# Patient Record
Sex: FEMALE | Race: WHITE | Employment: OTHER | URBAN - METROPOLITAN AREA
[De-identification: names, ages, dates, MRNs, and addresses within clinical notes are randomized per-mention and may not be internally consistent; named-entity substitution may affect disease eponyms.]

---

## 2018-01-03 ENCOUNTER — HOSPITAL ENCOUNTER (OUTPATIENT)
Dept: MAMMOGRAPHY | Age: 78
Discharge: HOME | End: 2018-01-03
Attending: INTERNAL MEDICINE
Payer: MEDICARE

## 2018-01-03 DIAGNOSIS — Z12.31 VISIT FOR SCREENING MAMMOGRAM: ICD-10-CM

## 2018-01-03 PROCEDURE — 77067 SCR MAMMO BI INCL CAD: CPT

## 2018-01-12 ENCOUNTER — HOSPITAL ENCOUNTER (OUTPATIENT)
Dept: GENERAL RADIOLOGY | Age: 78
Discharge: HOME | End: 2018-01-12
Attending: INTERNAL MEDICINE
Payer: MEDICARE

## 2018-01-12 DIAGNOSIS — M79.671 FOOT PAIN, RIGHT: ICD-10-CM

## 2018-01-12 PROCEDURE — 73620 X-RAY EXAM OF FOOT: CPT

## 2018-04-26 ENCOUNTER — HOSPITAL ENCOUNTER (OUTPATIENT)
Dept: MAMMOGRAPHY | Age: 78
Discharge: HOME | End: 2018-04-26
Attending: INTERNAL MEDICINE
Payer: MEDICARE

## 2018-04-26 DIAGNOSIS — Z78.0 POSTMENOPAUSAL: ICD-10-CM

## 2018-04-26 PROCEDURE — 77080 DXA BONE DENSITY AXIAL: CPT

## 2018-05-04 NOTE — PROGRESS NOTES
pls call- bone density shows osteoporosis- but only in wrist so no roller skating for you! .  Hip and spine show osteopenia, so continue to exercise and take vit. d as well as getting adequate calcium from diet.

## 2018-09-25 ENCOUNTER — APPOINTMENT (OUTPATIENT)
Dept: GENERAL RADIOLOGY | Age: 78
End: 2018-09-25
Attending: EMERGENCY MEDICINE
Payer: MEDICARE

## 2018-09-25 ENCOUNTER — HOSPITAL ENCOUNTER (EMERGENCY)
Age: 78
Discharge: HOME | End: 2018-09-25
Attending: EMERGENCY MEDICINE
Payer: MEDICARE

## 2018-09-25 VITALS
WEIGHT: 185 LBS | OXYGEN SATURATION: 95 % | HEART RATE: 64 BPM | BODY MASS INDEX: 31.58 KG/M2 | RESPIRATION RATE: 23 BRPM | TEMPERATURE: 98 F | DIASTOLIC BLOOD PRESSURE: 69 MMHG | HEIGHT: 64 IN | SYSTOLIC BLOOD PRESSURE: 173 MMHG

## 2018-09-25 DIAGNOSIS — R05.9 COUGH: Primary | ICD-10-CM

## 2018-09-25 PROCEDURE — 99284 EMERGENCY DEPT VISIT MOD MDM: CPT

## 2018-09-25 PROCEDURE — 93005 ELECTROCARDIOGRAM TRACING: CPT

## 2018-09-25 PROCEDURE — 71046 X-RAY EXAM CHEST 2 VIEWS: CPT

## 2018-09-25 NOTE — ED TRIAGE NOTES
Pt arrives with persistent cough x several weeks despite mucinex and 2 antibiotic prescription. Denies fever. Denies SOB. O2 sats 97%.

## 2018-09-25 NOTE — ED PROVIDER NOTES
HPI Comments: 77 y.o. female with past medical history significant for TIA, glaucoma, major depression, osteopenia, GERD, colon polyps, asthma, hyperlipidemia, mitral valve prolapse, and macular edema who presents from PCP office for Chest X-Ray. Patient states that she has been coughing for the past month. She notes that her symptoms have progressively improved since taking antibiotics and Mucinex. Patient notes that she visited her PCP, Dr. Grace, today for evaluation of her cough and discussion of medications as she is now prescribed antibiotics from her podiatrist for an inflamed heel. Patient was advised by her PCP to visit [facility] for a chest x-ray as an outpatient but was sent to the ED because she arrived after the facility had closed. Of note, patient claims she has had a h/o bronchitis and \"yellow phlegm\" that typically resolves more quickly than her current symptoms. Patient adds that tests 10 years ago indicated that \"she does not have asthma. \"  She mentions that she has recently enrolled in [facility] [employer] with her . Patient denies fever, chest pain, SOB, nausea, vomiting, abdominal pain, and chills. There are no other acute medical concerns at this time. Social hx: former tobacco use; frequent alcohol use; unknown drug use PCP: Freeman Up MD 
 
Note written by Squire Severance, as dictated by Kenyon Enriquez MD 6:34 PM 
 
 
 
 
The history is provided by the patient. No  was used. Past Medical History:  
Diagnosis Date  Asthma  Colon polyps   
 tubular adenoma 2017  Colon polyps  Fracture 2017 Right humerus (pt fell)  GERD (gastroesophageal reflux disease)  Glaucoma  Humeral fracture 2017  Hyperlipidemia  Macular edema  Major depression in partial remission ([facility] [address]  Mitral valve prolapse  Osteopenia  TIA (transient ischemic attack) Past Surgical History: Procedure Laterality Date  HX BREAST BIOPSY Left   
 benign surgical bx done yrs ago  HX CATARACT REMOVAL    
 HX  SECTION    
 HX CHOLECYSTECTOMY  HX COLONOSCOPY    
 ,   HX TONSILLECTOMY  HX WISDOM TEETH EXTRACTION Family History:  
Problem Relation Age of Onset  Hypertension Mother 80  Coronary Artery Disease Mother  Alcohol abuse Father  Stroke Father  Arrhythmia Brother   
   a fib  Hypertension Brother  Cancer Maternal Grandmother Social History Social History  Marital status:  Spouse name: N/A  
 Number of children: N/A  
 Years of education: N/A Occupational History  retired  Social History Main Topics  Smoking status: Former Smoker Packs/day: 0.25 Years: 10.00 Types: Cigarettes  Smokeless tobacco: Never Used  Alcohol use 4.2 oz/week  
  7 Glasses of wine per week  Drug use: Yes Special: Prescription, OTC  Sexual activity: Not on file Other Topics Concern  Not on file Social History Narrative ALLERGIES: Review of patient's allergies indicates no known allergies. Review of Systems Constitutional: Negative for activity change, chills and fever. HENT: Negative for nosebleeds, sore throat, trouble swallowing and voice change. Eyes: Negative for visual disturbance. Respiratory: Positive for cough. Negative for shortness of breath. Cardiovascular: Negative for chest pain and palpitations. Gastrointestinal: Negative for abdominal pain, constipation, diarrhea and nausea. Genitourinary: Negative for difficulty urinating, dysuria, hematuria and urgency. Musculoskeletal: Negative for back pain, neck pain and neck stiffness. Skin: Negative for color change. Allergic/Immunologic: Negative for immunocompromised state.   
Neurological: Negative for dizziness, seizures, syncope, weakness, light-headedness, numbness and headaches. Psychiatric/Behavioral: Negative for behavioral problems, confusion, hallucinations, self-injury and suicidal ideas. All other systems reviewed and are negative. Vitals:  
 09/25/18 1718 09/25/18 1835 BP: 141/77 169/69 Pulse: 79 (!) 117 Resp: 18 14 Temp: 97.7 °F (36.5 °C) 97.7 °F (36.5 °C) SpO2: 97% 97% Weight: 83.9 kg (185 lb) Height: 5' 4\" (1.626 m) Physical Exam  
Constitutional: She is oriented to person, place, and time. She appears well-developed and well-nourished. No distress. Well-appearing elderly female; HENT:  
Head: Normocephalic and atraumatic. Eyes: Pupils are equal, round, and reactive to light. Neck: Normal range of motion. Neck supple. Cardiovascular: Normal rate, regular rhythm and normal heart sounds. Exam reveals no gallop and no friction rub. No murmur heard. Afebrile without tachycardia or hypotension Pulmonary/Chest: Effort normal and breath sounds normal. No respiratory distress. She has no wheezes. She has no rales. Clear breath sounds; no crackles, rales, wheezes Abdominal: Soft. Bowel sounds are normal. She exhibits no distension. There is no tenderness. There is no rebound and no guarding. Musculoskeletal: Normal range of motion. She exhibits no edema (no pedal edema). Neurological: She is alert and oriented to person, place, and time. Skin: Skin is warm. No rash noted. She is not diaphoretic. Psychiatric: She has a normal mood and affect. Her behavior is normal. Judgment and thought content normal.  
Nursing note and vitals reviewed. Note written by Rodolfo Lantigua, as dictated by Selena Joe MD 6:34 PM  
 
 
[facility] 
 
 
ED Course This is a 71-year-old female with past medical history, review of systems, physical exam as above, presenting from her primary care physician's office for x-ray, following several weeks of improving cough.  The patient denies chest pain, dyspnea, recent travel, fevers, chills, nausea or vomiting. She states she is on her second course of antibiotics, using decongestants and feels the symptoms are improving, however her primary care physician water her to receive a chest x-ray. Physical exam is remarkable for well-appearing elderly female in no acute distress, noted afebrile without tachycardia or hypotension. She has clear breath sounds throughout without crackles, rales or wheezes, no pedal edema. The patient has a history of asthma is well controlled. Given benign vital signs and physical exam, and proceed with chest x-ray, and make a disposition based the patient's diagnostics and response to therapy. Procedures 8:22 PM 
Patient with unremarkable CXR, EKG, remains largely asymptomatic. Will DC home with PCP follow up and return precautions.

## 2018-09-26 NOTE — DISCHARGE INSTRUCTIONS
Cough: Care Instructions  Your Care Instructions    A cough is your body's response to something that bothers your throat or airways. Many things can cause a cough. You might cough because of a cold or the flu, bronchitis, or asthma. Smoking, postnasal drip, allergies, and stomach acid that backs up into your throat also can cause coughs. A cough is a symptom, not a disease. Most coughs stop when the cause, such as a cold, goes away. You can take a few steps at home to cough less and feel better. Follow-up care is a key part of your treatment and safety. Be sure to make and go to all appointments, and call your doctor if you are having problems. It's also a good idea to know your test results and keep a list of the medicines you take. How can you care for yourself at home? · Drink lots of water and other fluids. This helps thin the mucus and soothes a dry or sore throat. Honey or lemon juice in hot water or tea may ease a dry cough. · Take cough medicine as directed by your doctor. · Prop up your head on pillows to help you breathe and ease a dry cough. · Try cough drops to soothe a dry or sore throat. Cough drops don't stop a cough. Medicine-flavored cough drops are no better than candy-flavored drops or hard candy. · Do not smoke. Avoid secondhand smoke. If you need help quitting, talk to your doctor about stop-smoking programs and medicines. These can increase your chances of quitting for good. When should you call for help? Call 911 anytime you think you may need emergency care.  For example, call if:    · You have severe trouble breathing.    Call your doctor now or seek immediate medical care if:    · You cough up blood.     · You have new or worse trouble breathing.     · You have a new or higher fever.     · You have a new rash.    Watch closely for changes in your health, and be sure to contact your doctor if:    · You cough more deeply or more often, especially if you notice more mucus or a change in the color of your mucus.     · You have new symptoms, such as a sore throat, an earache, or sinus pain.     · You do not get better as expected. Where can you learn more? Go to http://robin-matty.info/. Enter D279 in the search box to learn more about \"Cough: Care Instructions. \"  Current as of: December 6, 2017  Content Version: 11.7  © 1344-6561 [employer]. Care instructions adapted under license by [employer] (which disclaims liability or warranty for this information). If you have questions about a medical condition or this instruction, always ask your healthcare professional. [address] any warranty or liability for your use of this information.

## 2018-09-27 LAB
ATRIAL RATE: 74 BPM
CALCULATED P AXIS, ECG09: 60 DEGREES
CALCULATED R AXIS, ECG10: -6 DEGREES
CALCULATED T AXIS, ECG11: 6 DEGREES
DIAGNOSIS, 93000: NORMAL
P-R INTERVAL, ECG05: 174 MS
Q-T INTERVAL, ECG07: 408 MS
QRS DURATION, ECG06: 78 MS
QTC CALCULATION (BEZET), ECG08: 452 MS
VENTRICULAR RATE, ECG03: 74 BPM

## 2019-01-04 ENCOUNTER — HOSPITAL ENCOUNTER (OUTPATIENT)
Dept: MAMMOGRAPHY | Age: 79
Discharge: HOME | End: 2019-01-04
Attending: INTERNAL MEDICINE
Payer: MEDICARE

## 2019-01-04 DIAGNOSIS — Z12.31 VISIT FOR SCREENING MAMMOGRAM: ICD-10-CM

## 2019-01-04 PROCEDURE — 77063 BREAST TOMOSYNTHESIS BI: CPT

## 2019-02-04 ENCOUNTER — HOSPITAL ENCOUNTER (OUTPATIENT)
Dept: GENERAL RADIOLOGY | Age: 79
Discharge: HOME | End: 2019-02-04
Attending: INTERNAL MEDICINE
Payer: MEDICARE

## 2019-02-04 DIAGNOSIS — M25.532 LEFT WRIST PAIN: ICD-10-CM

## 2019-02-04 PROCEDURE — 73100 X-RAY EXAM OF WRIST: CPT

## 2020-01-13 ENCOUNTER — ANESTHESIA EVENT (OUTPATIENT)
Dept: ENDOSCOPY | Age: 80
End: 2020-01-13
Payer: MEDICARE

## 2020-01-13 ENCOUNTER — ANESTHESIA (OUTPATIENT)
Dept: ENDOSCOPY | Age: 80
End: 2020-01-13
Payer: MEDICARE

## 2020-01-13 ENCOUNTER — HOSPITAL ENCOUNTER (OUTPATIENT)
Age: 80
Setting detail: OUTPATIENT SURGERY
Discharge: HOME | End: 2020-01-13
Attending: INTERNAL MEDICINE | Admitting: INTERNAL MEDICINE
Payer: MEDICARE

## 2020-01-13 VITALS
OXYGEN SATURATION: 97 % | HEIGHT: 65 IN | DIASTOLIC BLOOD PRESSURE: 66 MMHG | RESPIRATION RATE: 19 BRPM | TEMPERATURE: 98 F | HEART RATE: 64 BPM | SYSTOLIC BLOOD PRESSURE: 135 MMHG | BODY MASS INDEX: 30.99 KG/M2 | WEIGHT: 186 LBS

## 2020-01-13 PROCEDURE — 77030009426 HC FCPS BIOP ENDOSC BSC -B: Performed by: INTERNAL MEDICINE

## 2020-01-13 PROCEDURE — 74011250637 HC RX REV CODE- 250/637: Performed by: INTERNAL MEDICINE

## 2020-01-13 PROCEDURE — 76060000032 HC ANESTHESIA 0.5 TO 1 HR: Performed by: INTERNAL MEDICINE

## 2020-01-13 PROCEDURE — 88305 TISSUE EXAM BY PATHOLOGIST: CPT

## 2020-01-13 PROCEDURE — 74011250636 HC RX REV CODE- 250/636: Performed by: NURSE ANESTHETIST, CERTIFIED REGISTERED

## 2020-01-13 PROCEDURE — 74011000250 HC RX REV CODE- 250: Performed by: NURSE ANESTHETIST, CERTIFIED REGISTERED

## 2020-01-13 PROCEDURE — 76040000007: Performed by: INTERNAL MEDICINE

## 2020-01-13 RX ORDER — MIDAZOLAM HYDROCHLORIDE 1 MG/ML
.25-5 INJECTION, SOLUTION INTRAMUSCULAR; INTRAVENOUS
Status: DISCONTINUED | OUTPATIENT
Start: 2020-01-13 | End: 2020-01-13 | Stop reason: HOSPADM

## 2020-01-13 RX ORDER — SODIUM CHLORIDE 9 MG/ML
50 INJECTION, SOLUTION INTRAVENOUS CONTINUOUS
Status: DISCONTINUED | OUTPATIENT
Start: 2020-01-13 | End: 2020-01-13 | Stop reason: HOSPADM

## 2020-01-13 RX ORDER — SODIUM CHLORIDE 9 MG/ML
INJECTION, SOLUTION INTRAVENOUS
Status: DISCONTINUED | OUTPATIENT
Start: 2020-01-13 | End: 2020-01-13 | Stop reason: HOSPADM

## 2020-01-13 RX ORDER — SODIUM CHLORIDE 0.9 % (FLUSH) 0.9 %
5-40 SYRINGE (ML) INJECTION EVERY 8 HOURS
Status: DISCONTINUED | OUTPATIENT
Start: 2020-01-13 | End: 2020-01-13 | Stop reason: HOSPADM

## 2020-01-13 RX ORDER — FENTANYL CITRATE 50 UG/ML
25-200 INJECTION, SOLUTION INTRAMUSCULAR; INTRAVENOUS
Status: DISCONTINUED | OUTPATIENT
Start: 2020-01-13 | End: 2020-01-13 | Stop reason: HOSPADM

## 2020-01-13 RX ORDER — ATROPINE SULFATE 0.1 MG/ML
0.5 INJECTION INTRAVENOUS
Status: DISCONTINUED | OUTPATIENT
Start: 2020-01-13 | End: 2020-01-13 | Stop reason: HOSPADM

## 2020-01-13 RX ORDER — FLUMAZENIL 0.1 MG/ML
0.2 INJECTION INTRAVENOUS
Status: DISCONTINUED | OUTPATIENT
Start: 2020-01-13 | End: 2020-01-13 | Stop reason: HOSPADM

## 2020-01-13 RX ORDER — EPINEPHRINE 0.1 MG/ML
1 INJECTION INTRACARDIAC; INTRAVENOUS
Status: DISCONTINUED | OUTPATIENT
Start: 2020-01-13 | End: 2020-01-13 | Stop reason: HOSPADM

## 2020-01-13 RX ORDER — LIDOCAINE HYDROCHLORIDE 20 MG/ML
INJECTION, SOLUTION INFILTRATION; PERINEURAL AS NEEDED
Status: DISCONTINUED | OUTPATIENT
Start: 2020-01-13 | End: 2020-01-13 | Stop reason: HOSPADM

## 2020-01-13 RX ORDER — PROPOFOL 10 MG/ML
INJECTION, EMULSION INTRAVENOUS AS NEEDED
Status: DISCONTINUED | OUTPATIENT
Start: 2020-01-13 | End: 2020-01-13 | Stop reason: HOSPADM

## 2020-01-13 RX ORDER — SODIUM CHLORIDE 0.9 % (FLUSH) 0.9 %
5-40 SYRINGE (ML) INJECTION AS NEEDED
Status: DISCONTINUED | OUTPATIENT
Start: 2020-01-13 | End: 2020-01-13 | Stop reason: HOSPADM

## 2020-01-13 RX ORDER — NALOXONE HYDROCHLORIDE 0.4 MG/ML
0.4 INJECTION, SOLUTION INTRAMUSCULAR; INTRAVENOUS; SUBCUTANEOUS
Status: DISCONTINUED | OUTPATIENT
Start: 2020-01-13 | End: 2020-01-13 | Stop reason: HOSPADM

## 2020-01-13 RX ORDER — DEXTROMETHORPHAN/PSEUDOEPHED 2.5-7.5/.8
1.2 DROPS ORAL
Status: DISCONTINUED | OUTPATIENT
Start: 2020-01-13 | End: 2020-01-13 | Stop reason: HOSPADM

## 2020-01-13 RX ADMIN — PROPOFOL 20 MG: 10 INJECTION, EMULSION INTRAVENOUS at 11:49

## 2020-01-13 RX ADMIN — PROPOFOL 20 MG: 10 INJECTION, EMULSION INTRAVENOUS at 12:00

## 2020-01-13 RX ADMIN — PROPOFOL 20 MG: 10 INJECTION, EMULSION INTRAVENOUS at 11:56

## 2020-01-13 RX ADMIN — PROPOFOL 20 MG: 10 INJECTION, EMULSION INTRAVENOUS at 11:41

## 2020-01-13 RX ADMIN — PROPOFOL 20 MG: 10 INJECTION, EMULSION INTRAVENOUS at 11:45

## 2020-01-13 RX ADMIN — PROPOFOL 20 MG: 10 INJECTION, EMULSION INTRAVENOUS at 11:48

## 2020-01-13 RX ADMIN — LIDOCAINE HYDROCHLORIDE 80 MG: 20 INJECTION, SOLUTION INFILTRATION; PERINEURAL at 11:39

## 2020-01-13 RX ADMIN — PROPOFOL 20 MG: 10 INJECTION, EMULSION INTRAVENOUS at 11:47

## 2020-01-13 RX ADMIN — PROPOFOL 20 MG: 10 INJECTION, EMULSION INTRAVENOUS at 11:58

## 2020-01-13 RX ADMIN — PROPOFOL 20 MG: 10 INJECTION, EMULSION INTRAVENOUS at 11:54

## 2020-01-13 RX ADMIN — PROPOFOL 20 MG: 10 INJECTION, EMULSION INTRAVENOUS at 11:52

## 2020-01-13 RX ADMIN — PROPOFOL 20 MG: 10 INJECTION, EMULSION INTRAVENOUS at 11:44

## 2020-01-13 RX ADMIN — PROPOFOL 50 MG: 10 INJECTION, EMULSION INTRAVENOUS at 11:39

## 2020-01-13 RX ADMIN — PROPOFOL 50 MG: 10 INJECTION, EMULSION INTRAVENOUS at 11:40

## 2020-01-13 RX ADMIN — PROPOFOL 20 MG: 10 INJECTION, EMULSION INTRAVENOUS at 11:51

## 2020-01-13 RX ADMIN — PROPOFOL 20 MG: 10 INJECTION, EMULSION INTRAVENOUS at 11:43

## 2020-01-13 RX ADMIN — SODIUM CHLORIDE: 900 INJECTION, SOLUTION INTRAVENOUS at 11:31

## 2020-01-13 RX ADMIN — PROPOFOL 20 MG: 10 INJECTION, EMULSION INTRAVENOUS at 11:46

## 2020-01-13 RX ADMIN — PROPOFOL 20 MG: 10 INJECTION, EMULSION INTRAVENOUS at 11:53

## 2020-01-13 RX ADMIN — PROPOFOL 20 MG: 10 INJECTION, EMULSION INTRAVENOUS at 11:59

## 2020-01-13 RX ADMIN — PROPOFOL 20 MG: 10 INJECTION, EMULSION INTRAVENOUS at 11:42

## 2020-01-13 NOTE — ANESTHESIA PREPROCEDURE EVALUATION
Anesthetic History   No history of anesthetic complications            Review of Systems / Medical History  Patient summary reviewed, nursing notes reviewed and pertinent labs reviewed    Pulmonary            Asthma        Neuro/Psych         TIA and psychiatric history     Cardiovascular  Within defined limits                Exercise tolerance: >4 METS     GI/Hepatic/Renal     GERD           Endo/Other  Within defined limits           Other Findings              Physical Exam    Airway  Mallampati: II  TM Distance: 4 - 6 cm  Neck ROM: normal range of motion   Mouth opening: Normal     Cardiovascular    Rhythm: regular  Rate: normal         Dental  No notable dental hx       Pulmonary  Breath sounds clear to auscultation               Abdominal  Abdominal exam normal       Other Findings            Anesthetic Plan    ASA: 2  Anesthesia type: MAC          Induction: Intravenous  Anesthetic plan and risks discussed with: Patient

## 2020-01-13 NOTE — H&P
pain, fever, weight loss, pain with defecation, cramping, bloating, anxiety, diabetes mellitus, constipation, heat intolerance, joint pains, nausea, nocturnal bowel movements, past history of abdominal surgery, recent travel to tropics, similar illness in other people eating the same meal, skin lesions, start of a new medication, tenesmus, upper respiratory infection symptoms, vomiting, weakness, fecal urgency, alcohol abuse, recent antibiotic use, history of lactose intolerance, history of irritable bowel syndrome, history of Crohn's, history of ulcerative colitis, excessive alcohol intake, history of thyroid disease, cold intolerance, family history of colon cancer, family history of IBD, family history of celiac sprue, similar illness in other people eating the same food, HIV infection, chronic diarrhea, arslan-colored stools, excessive alcohol use, excessive caffeine intake, incontinence of stool, steatorrhea, sense of incomplete evacuation (>25 percent of time), pain relieved with defecation, change in bowel habits, recent travel, history of radiation for prostate cancer, similar illness in other people or other symptoms. Note for \"Diarrhea\": she has h/o colonic polyps in 2017 done in the [facility] and was recommended follow up colonoscopy 1 year after that for low grade dysplasia seen on cecal polyp. she c/o last month of diarrhea for few days, resolved, no current GI problems      Problem List/Past Medical Aniket Aleman; 2019 2:04 PM)  Transient ischemic attack      Past Surgical History Aniket Aleman; 2019 2:04 PM)  Cholecystectomy     Delivery   x2    Allergies Aniket Aleman; 2019 2:04 PM)  No Known Drug Allergies  [10/28/2019]:  No Known Allergies  [10/28/2019]:    Medication History (Conrad Aleman; 2019 2:04 PM)  Citalopram Hydrobromide  (20MG Tablet, 1 Oral daily) Active. Omeprazole  (20MG Capsule DR, 1 Oral daily) Active.   Baby Aspirin  (81MG Tablet Chewable, 1 Oral daily) Active. Vitamin D (Cholecalciferol)  (1000UNIT Capsule, 2 Oral daily) Active. Alphagan  (0.2% Solution, Ophthalmic bid) Active. Latanoprost  (0.005% Emulsion, Ophthalmic daily) Active. Medications Reconciled     Family History Marii Sparks; 11/1/2019 2:05 PM)  None  [11/01/2019]:    Social History Marii Sparks; 11/1/2019 2:04 PM)  Marital status   . Employment status   Retired. Alcohol Use   Moderate alcohol use. Tobacco Use   Never smoker. Blood Transfusion   unsure    Diagnostic Studies History Marii Sparks; 11/1/2019 2:04 PM)  Colonoscopy   >6yrs    Health Maintenance History Marii Sparks; 11/1/2019 2:04 PM)  Flu Vaccine  [3162]:        Review of Systems (Jesus Sparks; 11/1/2019 2:04 PM)  General Not Present- Chronic Fatigue, Poor Appetite, Weight Gain and Weight Loss. Skin Not Present- Itching, Rash and Skin Color Changes. HEENT Not Present- Hearing Loss and Vertigo. Respiratory Not Present- Difficulty Breathing and TB exposure. Cardiovascular Not Present- Chest Pain, Use of Antibiotics before Dental Procedures and Use of Blood Thinners. Gastrointestinal Present- See HPI. Musculoskeletal Not Present- Arthritis, Hip Replacement Surgery and Knee Replacement Surgery. Neurological Not Present- Weakness. Psychiatric Not Present- Depression. Endocrine Not Present- Diabetes and Thyroid Problems. Hematology Not Present- Anemia. Vitals Marii Sparks; 11/1/2019 2:10 PM)  11/1/2019 2:04 PM  Weight: 188.6 lb   Height: 65 in   Body Surface Area: 1.93 m²   Body Mass Index: 31.38 kg/m²    Pulse: 80 (Regular)    Resp.: 20 (Unlabored)     BP: 112/68 (Sitting, Left Arm, Standard)              Physical Exam Lindalou Goldberg MD; 11/1/2019 4:08 PM)  General  Mental Status - Alert. General Appearance - Cooperative, Pleasant, Not in acute distress. Orientation - Oriented X3.   Build & Nutrition - Well nourished and Well developed. Integumentary  General Characteristics  Overall examination of the patient's skin reveals - no rashes, no bruises and no spider angiomas. Color - normal coloration of skin. Head and Neck  Neck  Global Assessment - full range of motion and supple, no bruit auscultated on the right, no bruit auscultated on the left, non-tender, no lymphadenopathy. Thyroid  Gland Characteristics - normal size and consistency. Eye  Eyeball - Left - No Exophthalmos. Eyeball - Right - No Exophthalmos. Sclera/Conjunctiva - Left - No Jaundice. Sclera/Conjunctiva - Right - No Jaundice. Chest and Lung Exam  Chest and lung exam reveals  - quiet, even and easy respiratory effort with no use of accessory muscles. Auscultation  Breath sounds - Normal. Adventitious sounds - No Adventitious sounds. Cardiovascular  Auscultation  Rhythm - Regular, No Tachycardia, No Bradycardia . Heart Sounds - Normal heart sounds , S1 WNL and S2 WNL, No S3, No Summation Gallop. Murmurs & Other Heart Sounds - Auscultation of the heart reveals - No Murmurs. Abdomen  Palpation/Percussion  Tenderness - Non-Tender. Rebound tenderness - No rebound. Rigidity (guarding) - No Rigidity. Dullness to percussion - No abnormal dullness to percussion. Liver - No hepatosplenomegaly. Abdominal Mass Palpable - No masses. Other Characteristics - No Ascites. Auscultation  Auscultation of the abdomen reveals - Bowel sounds normal, No Abdominal bruits and No Succussion splash. Rectal - Did not examine. Peripheral Vascular  Upper Extremity  Inspection - Left - Normal - No Clubbing, No Cyanosis, No Edema, Pulses Intact. Right - Normal - No Clubbing, No Cyanosis, No Edema, Pulses Intact. Palpation - Edema - Left - No edema. Right - No edema. Lower Extremity  Inspection - Left - Inspection Normal. Right - Inspection Normal. Palpation - Edema - Left - No edema. Right - No edema.     Neurologic  Neurologic evaluation reveals  - Cranial nerves grossly intact, no focal neurologic deficits. Motor  Involuntary Movements - Asterixis - not present. Musculoskeletal  Global Assessment  Gait and Station - normal gait and station. Assessment & Plan Melinda Quiroga MD; 11/1/2019 4:09 PM)  Diarrhea (787.91  R19.7)  Impression: she has h/o colonic polyps in 2017 done in the [facility] and was recommended follow up colonoscopy 1 year after that for low grade dysplasia seen on cecal polyp. she c/o last month of diarrhea for few days, resolved, no current GI problems  Current Plans  Pt Education - How to access health information online: discussed with patient and provided information. History of colon polyps (V12.72  Z86.010)  Impression: given sample of suprep  Current Plans  Discussed the risks and benefits of colonoscopy with the patient. Colonoscopy (51616) (Discussed risks and benefits with the patient to include:; perforation, post polypectomy, or post biopsy bleeding, missed lesions, and sedation reactions.)  Date of Surgery Update:  Michele Lewis was seen and examined. History and physical has been reviewed. The patient has been examined.  There have been no significant clinical changes since the completion of the originally dated History and Physical.    Signed By: Michaela Esteves MD     January 13, 2020 11:34 AM

## 2020-01-13 NOTE — PROCEDURES
[address]  [address] [facility], [address] [place] [address]      Colonoscopy Operative Report    Lindy Hendricks  [id number]  1940      Procedure Type:   Colonoscopy with polypectomy (hot biopsy)     Indications:    Personal history of colon polyps (screening only)   Date of last colonoscopy: 2017, Polyps  Yes    Pre-operative Diagnosis: see indication above    Post-operative Diagnosis:  See findings below    :  Natividad Tan MD    Surgical Assistant: None    Implants:  None    Referring Provider: Carrol Caba MD      Sedation:  MAC anesthesia Propofol      Procedure Details:  After informed consent was obtained with all risks and benefits of procedure explained and preoperative exam completed, the patient was taken to the endoscopy suite and placed in the left lateral decubitus position. Upon sequential sedation as per above, a digital rectal exam was performed demonstrating internal hemorrhoids. The Olympus videocolonoscope  was inserted in the rectum and carefully advanced to the cecum, which was identified by the ileocecal valve and appendiceal orifice. The cecum was identified by the ileocecal valve and appendiceal orifice. The quality of preparation was good. The colonoscope was slowly withdrawn with careful evaluation between folds. Retroflexion in the rectum was completed . Findings:   Rectum: normal  Sigmoid: mild diverticulosis  Descending Colon: normal  Transverse Colon: 9 mm polyp removed by hot biopsy  Ascending Colon: normal  Cecum: 9 mm polyp removed by hot biopsy        Specimen Removed:  as above    Complications: None. EBL:  None. Impression:    see findings    Recommendations: --Await pathology.       Recommendation for next colonscopy in 3 years  High fiber diet    Signed By: Natividad Tan MD     1/13/2020  12:06 PM

## 2020-01-13 NOTE — PERIOP NOTES

## 2020-01-13 NOTE — ANESTHESIA POSTPROCEDURE EVALUATION
Post-Anesthesia Evaluation and Assessment    Patient: Gail Abel MRN: [id number]  SSN: [id number]    YOB: 1940  Age: [de-identified] y.o. Sex: female      I have evaluated the patient and they are stable and ready for discharge from the PACU. Cardiovascular Function/Vital Signs  Visit Vitals  /68   Pulse 76   Temp 36.6 °C (97.9 °F)   Resp (!) 38   Ht 5' 5\" (1.651 m)   Wt 84.4 kg (186 lb)   SpO2 95%   BMI 30.95 kg/m²       Patient is status post MAC anesthesia for Procedure(s):  COLONOSCOPY  ENDOSCOPIC POLYPECTOMY. Nausea/Vomiting: None    Postoperative hydration reviewed and adequate. Pain:  Pain Scale 1: Numeric (0 - 10) (01/13/20 1108)  Pain Intensity 1: 0 (01/13/20 1108)   Managed    Neurological Status: At baseline    Mental Status, Level of Consciousness: Alert and  oriented to person, place, and time    Pulmonary Status:   O2 Device: CO2 nasal cannula (01/13/20 1202)   Adequate oxygenation and airway patent    Complications related to anesthesia: None    Post-anesthesia assessment completed.  No concerns    Signed By: Emili Macias MD     January 13, 2020

## 2020-01-13 NOTE — DISCHARGE INSTRUCTIONS
[address], [address] [place] [address]    COLON DISCHARGE INSTRUCTIONS    Maria Isabel Calix  [id number]  1940    Discomfort:  Redness at IV site- apply warm compress to area; if redness or soreness persist- contact your physician  There may be a slight amount of blood passed from the rectum  Gaseous discomfort- walking, belching will help relieve any discomfort  You may not operate a vehicle for 12 hours  You may not engage in an occupation involving machinery or appliances for rest of today  You may not drink alcoholic beverages for at least 12 hours  Avoid making any critical decisions for at least 24 hour  DIET:  You may resume your regular diet - however -  remember your colon is empty and a heavy meal will produce gas. Avoid these foods:  vegetables, fried / greasy foods, carbonated drinks     ACTIVITY:  You may  resume your normal daily activities it is recommended that you spend the remainder of the day resting -  avoid any strenuous activity. CALL M.D. ANY SIGN OF:   Increasing pain, nausea, vomiting  Abdominal distension (swelling)  New increased bleeding (oral or rectal)  Fever (chills)  Pain in chest area  Bloody discharge from nose or mouth  Shortness of breath      Follow-up Instructions:   Call Dr. Maryjean Boeck for any questions or problems at [phone]   High fiber diet          ENDOSCOPY FINDINGS:   Your colonoscopy showed 2 small polyps removed, and diverticulosis.   Telephone # [phone]      Signed By: Maryjean Boeck, MD     1/13/2020  12:09 PM       DISCHARGE SUMMARY from Nurse    The following personal items collected during your admission are returned to you:   Dental Appliance: Dental Appliances: None  Vision: Visual Aid: None  Hearing Aid:    Jewelry:    Clothing:    Other Valuables:    Valuables sent to safe:      Patient Education   Patient Education        Diverticulosis: Care Instructions  Your Care Instructions  In diverticulosis, pouches called diverticula form in the wall of the large intestine (colon). The pouches do not cause any pain or other symptoms. Most people who have diverticulosis do not know they have it. But the pouches sometimes bleed, and if they become infected, they can cause pain and other symptoms. When this happens, it is called diverticulitis. Diverticula form when pressure pushes the wall of the colon outward at certain weak points. A diet that is too low in fiber can cause diverticula. Follow-up care is a key part of your treatment and safety. Be sure to make and go to all appointments, and call your doctor if you are having problems. It's also a good idea to know your test results and keep a list of the medicines you take. How can you care for yourself at home? · Include fruits, leafy green vegetables, beans, and whole grains in your diet each day. These foods are high in fiber. · Take a fiber supplement, such as Citrucel or Metamucil, every day if needed. Read and follow all instructions on the label. · Drink plenty of fluids, enough so that your urine is light yellow or clear like water. If you have kidney, heart, or liver disease and have to limit fluids, talk with your doctor before you increase the amount of fluids you drink. · Get at least 30 minutes of exercise on most days of the week. Walking is a good choice. You also may want to do other activities, such as running, swimming, cycling, or playing tennis or team sports. · Cut out foods that cause gas, pain, or other symptoms. When should you call for help?   Call your doctor now or seek immediate medical care if:    · You have belly pain.     · You pass maroon or very bloody stools.     · You have a fever.     · You have nausea and vomiting.     · You have unusual changes in your bowel movements or abdominal swelling.     · You have burning pain when you urinate.     · You have abnormal vaginal discharge.     · You have shoulder pain.     · You have cramping pain that does not get better when you have a bowel movement or pass gas.     · You pass gas or stool from your urethra while urinating.    Watch closely for changes in your health, and be sure to contact your doctor if you have any problems. Where can you learn more? Go to http://robin-matty.info/. Enter R281 in the search box to learn more about \"Diverticulosis: Care Instructions. \"  Current as of: November 7, 2018  Content Version: 12.2  © 1440-2799 [employer]. Care instructions adapted under license by [employer] (which disclaims liability or warranty for this information). If you have questions about a medical condition or this instruction, always ask your healthcare professional. [address] any warranty or liability for your use of this information. Colon Polyps: Care Instructions  Your Care Instructions    Colon polyps are growths in the colon or the rectum. The cause of most colon polyps is not known, and most people who get them do not have any problems. But a certain kind can turn into cancer. For this reason, regular testing for colon polyps is important for people as they get older. It is also important for anyone who has an increased risk for colon cancer. Polyps are usually found through routine colon cancer screening tests. Although most colon polyps are not cancerous, they are usually removed and then tested for cancer. Screening for colon cancer saves lives because the cancer can usually be cured if it is caught early. If you have a polyp that is the type that can turn into cancer, you may need more tests to examine your entire colon. The doctor will remove any other polyps that he or she finds, and you will be tested more often. Follow-up care is a key part of your treatment and safety. Be sure to make and go to all appointments, and call your doctor if you are having problems.  It's also a good idea to know your test results and keep a list of the medicines you take. How can you care for yourself at home? Regular exams to look for colon polyps are the best way to prevent polyps from turning into colon cancer. These can include stool tests, sigmoidoscopy, colonoscopy, and CT colonography. Talk with your doctor about a testing schedule that is right for you. To prevent polyps  There is no home treatment that can prevent colon polyps. But these steps may help lower your risk for cancer. · Stay active. Being active can help you get to and stay at a healthy weight. Try to exercise on most days of the week. Walking is a good choice. · Eat well. Choose a variety of vegetables, fruits, legumes (such as peas and beans), fish, poultry, and whole grains. · Do not smoke. If you need help quitting, talk to your doctor about stop-smoking programs and medicines. These can increase your chances of quitting for good. · If you drink alcohol, limit how much you drink. Limit alcohol to 2 drinks a day for men and 1 drink a day for women. When should you call for help? Call your doctor now or seek immediate medical care if:    · You have severe belly pain.     · Your stools are maroon or very bloody.    Watch closely for changes in your health, and be sure to contact your doctor if:    · You have a fever.     · You have nausea or vomiting.     · You have a change in bowel habits (new constipation or diarrhea).     · Your symptoms get worse or are not improving as expected. Where can you learn more? Go to http://robin-matty.info/. Enter 95 [id number] in the search box to learn more about \"Colon Polyps: Care Instructions. \"  Current as of: December 19, 2018  Content Version: 12.2  © 4274-1181 [employer]. Care instructions adapted under license by [employer] (which disclaims liability or warranty for this information).  If you have questions about a medical condition or this instruction, always ask your healthcare professional. [place] [address] any warranty or liability for your use of this information.

## 2020-02-05 ENCOUNTER — HOSPITAL ENCOUNTER (OUTPATIENT)
Dept: MAMMOGRAPHY | Age: 80
Discharge: HOME | End: 2020-02-05
Attending: INTERNAL MEDICINE
Payer: MEDICARE

## 2020-02-05 DIAGNOSIS — Z12.31 VISIT FOR SCREENING MAMMOGRAM: ICD-10-CM

## 2020-02-05 PROCEDURE — 77063 BREAST TOMOSYNTHESIS BI: CPT

## 2020-04-16 ENCOUNTER — HOSPITAL ENCOUNTER (OUTPATIENT)
Dept: CT IMAGING | Age: 80
Discharge: HOME | End: 2020-04-16
Attending: INTERNAL MEDICINE
Payer: MEDICARE

## 2020-04-16 DIAGNOSIS — R47.81 SLURRED SPEECH: ICD-10-CM

## 2020-04-16 PROCEDURE — 70450 CT HEAD/BRAIN W/O DYE: CPT

## 2020-09-14 ENCOUNTER — HOSPITAL ENCOUNTER (OUTPATIENT)
Dept: ULTRASOUND IMAGING | Age: 80
Discharge: HOME | End: 2020-09-14
Attending: INTERNAL MEDICINE
Payer: MEDICARE

## 2020-09-14 DIAGNOSIS — R31.9 HEMATURIA, UNSPECIFIED TYPE: ICD-10-CM

## 2020-09-14 PROCEDURE — 76770 US EXAM ABDO BACK WALL COMP: CPT

## 2021-01-09 LAB
CREATININE, EXTERNAL: 1.01
HBA1C MFR BLD HPLC: 5.8 %
LDL-C, EXTERNAL: 119

## 2021-01-26 ENCOUNTER — OFFICE VISIT (OUTPATIENT)
Dept: CARDIOLOGY CLINIC | Age: 81
End: 2021-01-26
Payer: MEDICARE

## 2021-01-26 VITALS
BODY MASS INDEX: 32.55 KG/M2 | OXYGEN SATURATION: 98 % | HEIGHT: 65 IN | SYSTOLIC BLOOD PRESSURE: 134 MMHG | DIASTOLIC BLOOD PRESSURE: 84 MMHG | WEIGHT: 195.4 LBS | HEART RATE: 68 BPM | RESPIRATION RATE: 13 BRPM

## 2021-01-26 DIAGNOSIS — R06.02 SHORTNESS OF BREATH: ICD-10-CM

## 2021-01-26 DIAGNOSIS — Z86.73 HISTORY OF TIA (TRANSIENT ISCHEMIC ATTACK): ICD-10-CM

## 2021-01-26 DIAGNOSIS — I34.1 MITRAL VALVE PROLAPSE: ICD-10-CM

## 2021-01-26 DIAGNOSIS — E78.2 MIXED HYPERLIPIDEMIA: ICD-10-CM

## 2021-01-26 DIAGNOSIS — R00.2 PALPITATIONS: ICD-10-CM

## 2021-01-26 DIAGNOSIS — R06.09 DYSPNEA ON EXERTION: Primary | ICD-10-CM

## 2021-01-26 PROCEDURE — 99204 OFFICE O/P NEW MOD 45 MIN: CPT | Performed by: INTERNAL MEDICINE

## 2021-01-26 PROCEDURE — 1090F PRES/ABSN URINE INCON ASSESS: CPT | Performed by: INTERNAL MEDICINE

## 2021-01-26 PROCEDURE — G0463 HOSPITAL OUTPT CLINIC VISIT: HCPCS | Performed by: INTERNAL MEDICINE

## 2021-01-26 NOTE — PROGRESS NOTES
Reason for consult: dyspnea   Requesting physician: Dr. Kari Olivarez     HPI: Leticia Rico, a 79y.o. year-old who presents for evaluation of dyspnea. Having some dyspnea with exertion over the winter  Has felt very fatigued   passed in October and having a lot of stress with her family/daughters  Has more dyspnea with activity  No PND  No chest pain  Has intermittent palpitations, notices them mostly at night, no high risk features  No syncope  She is dizzy all the time - planning to do PT for this    Spent some time discussing fatigue and dyspnea on exertion as anginal equivalent in women especially after age 72. She has not had any really severe episodes where she was dizzy or lightheaded and has not had any chest pain and its enough for us to be concerned about. Is hard to tell how much of this is stress related to the passing of her  and we talked about that a little bit also. It certainly is time for us to reevaluate her heart function and her mitral valve because of her prior history. We also discussed her history of TIA the difference between a TIA and CVA and A. fib has a possible unifying diagnosis for fatigue and TIA especially in the setting of MVP. She is agreeable to an exercise stress echo and think she can walk on the treadmill to do that will also give us a chance to look at the mitral valve again. Assessment/Plan:  1. Dyspnea with exertion - will order stress echo to assess for ischemia   2. Dyslipidemia - , advised her to work on diet and exercise   3. IGT - A1C 5.8%, advised her to work on diet and exercise   4. Hx of MVP - will assess for this with echo   5. Hx of TIA in 2015 - had difficulty speaking, on ASA   6. GERD - on PPI  7. Hypothyroidism - on levothyroxine, recent TSH 5.3  8. Vitamin D deficiency - on supplementation  9.   Palpitations - will order 2 week loop monitor to assess for arrhythmias given hx of TIA and family history of AFib    Fam Hx: mother had HTN, had MI at age 79, lived to age 80, father had a CVA, brother has Afib  Soc Hx: no tobacco use, no etoh use     She  has a past medical history of Asthma, Colon polyps, Colon polyps, Fracture (2017), GERD (gastroesophageal reflux disease), Glaucoma, Humeral fracture, Hyperlipidemia, Macular edema, Major depression in partial remission ([address] Mitral valve prolapse, Osteopenia, and TIA (transient ischemic attack). Cardiovascular ROS: no chest pain or dyspnea on exertion  Respiratory ROS: no cough, shortness of breath, or wheezing  Neurological ROS: no TIA or stroke symptoms  All other systems negative except as above. PE  Vitals:    01/26/21 1526   BP: 134/84   Pulse: 68   Resp: 13   SpO2: 98%   Weight: 195 lb 6.4 oz (88.6 kg)   Height: 5' 5\" (1.651 m)    Body mass index is 32.52 kg/m².    General appearance - alert, well appearing, and in no distress  Mental status - affect appropriate to mood  Eyes - sclera anicteric, moist mucous membranes  Neck - supple  Lymphatics - not assessed  Chest - clear to auscultation, no wheezes, rales or rhonchi  Heart - normal rate, regular rhythm, normal S1, S2, no murmurs, rubs, clicks or gallops  Abdomen - soft, nontender, nondistended  Back exam - full range of motion, no tenderness  Neurological - cranial nerves II through XII grossly intact, no focal deficit  Musculoskeletal - no muscular tenderness noted, normal strength  Extremities - peripheral pulses normal, no pedal edema  Skin - normal coloration  no rashes    12 lead ECG:    Recent Labs:  Lab Results   Component Value Date/Time    Cholesterol, total 199 08/14/2020 01:53 PM    HDL Cholesterol 59 08/14/2020 01:53 PM    LDL, calculated 118 (H) 08/14/2020 01:53 PM    Triglyceride 108 08/14/2020 01:53 PM     Lab Results   Component Value Date/Time    Creatinine 0.93 08/14/2020 01:53 PM     Lab Results   Component Value Date/Time    BUN 17 08/14/2020 01:53 PM     Lab Results   Component Value Date/Time Potassium 4.4 08/14/2020 01:53 PM     No results found for: HBA1C, HGBE8, RUC7LBWL, URS2NKVH  No results found for: HGB, HGBP, HGBEXT, HGBEXT  No results found for: PLT, PLTEXT, PLTEXT    Reviewed:  Past Medical History:   Diagnosis Date    Asthma     Colon polyps     tubular adenoma 2017    Colon polyps     Fracture 2017    Right humerus (pt fell)    GERD (gastroesophageal reflux disease)     Glaucoma     Humeral fracture     2017    Hyperlipidemia     Macular edema     Major depression in partial remission ([address]     Mitral valve prolapse     Osteopenia     TIA (transient ischemic attack)      Social History     Tobacco Use   Smoking Status Former Smoker    Packs/day: 0.25    Years: 10.00    Pack years: 2.50    Types: Cigarettes   Smokeless Tobacco Never Used     Social History     Substance and Sexual Activity   Alcohol Use Yes    Alcohol/week: 7.0 standard drinks    Types: 7 Glasses of wine per week     No Known Allergies    Current Outpatient Medications   Medication Sig    dorzolamide (TRUSOPT) 2 % ophthalmic solution Administer 2 Drops to both eyes three (3) times daily.  olopatadine (Pataday) 0.2 % drop ophthalmic solution Administer 1 Drop to both eyes daily.  citalopram (CELEXA) 20 mg tablet TAKE 1 AND 1/2 TABLETS BY MOUTH EVERY DAY.  levothyroxine (SYNTHROID) 50 mcg tablet Take 1 Tab by mouth Daily (before breakfast).  omeprazole (PRILOSEC) 20 mg capsule TAKE 1 CAPSULE BY MOUTH EVERY DAY.  cholecalciferol, VITAMIN D3, (VITAMIN D3) 5,000 unit tab tablet Take  by mouth daily.  aspirin 81 mg chewable tablet Take 81 mg by mouth daily.  latanoprost (XALATAN) 0.005 % ophthalmic solution Administer 1 Drop to both eyes nightly. No current facility-administered medications for this visit.         Barry Etienne MD  [facility] [address] [facility] [address], [address] [facility] [address]  [facility]  [phone]

## 2021-02-09 ENCOUNTER — HOSPITAL ENCOUNTER (OUTPATIENT)
Dept: MAMMOGRAPHY | Age: 81
Discharge: HOME | End: 2021-02-09
Attending: INTERNAL MEDICINE
Payer: MEDICARE

## 2021-02-09 DIAGNOSIS — Z12.31 VISIT FOR SCREENING MAMMOGRAM: ICD-10-CM

## 2021-02-09 DIAGNOSIS — Z78.0 POSTMENOPAUSAL: ICD-10-CM

## 2021-02-09 PROCEDURE — 77080 DXA BONE DENSITY AXIAL: CPT

## 2021-02-10 ENCOUNTER — ANCILLARY PROCEDURE (OUTPATIENT)
Dept: CARDIOLOGY CLINIC | Age: 81
End: 2021-02-10
Payer: MEDICARE

## 2021-02-10 ENCOUNTER — APPOINTMENT (OUTPATIENT)
Dept: CARDIOLOGY CLINIC | Age: 81
End: 2021-02-10

## 2021-02-10 VITALS
HEIGHT: 65 IN | WEIGHT: 195 LBS | BODY MASS INDEX: 32.49 KG/M2 | SYSTOLIC BLOOD PRESSURE: 120 MMHG | DIASTOLIC BLOOD PRESSURE: 72 MMHG

## 2021-02-10 DIAGNOSIS — R06.09 DYSPNEA ON EXERTION: ICD-10-CM

## 2021-02-10 LAB
ECHO TV REGURGITANT MAX VELOCITY: 218.05 CM/S
ECHO TV REGURGITANT PEAK GRADIENT: 19.02 MMHG
STRESS ANGINA INDEX: 0
STRESS BASELINE DIAS BP: 72 MMHG
STRESS BASELINE HR: 78 BPM
STRESS BASELINE SYS BP: 120 MMHG
STRESS EXERCISE DUR MIN: NORMAL MIN:SEC
STRESS O2 SAT PEAK: 94 %
STRESS PEAK DIAS BP: 80 MMHG
STRESS PEAK SYS BP: 230 MMHG
STRESS PERCENT HR ACHIEVED: 86 %
STRESS POST PEAK HR: 120 BPM
STRESS RATE PRESSURE PRODUCT: NORMAL BPM*MMHG
STRESS SR DUKE TREADMILL SCORE: 3
STRESS ST DEPRESSION: 0 MM
STRESS ST ELEVATION: 0 MM
STRESS TARGET HR: 139 BPM

## 2021-02-10 PROCEDURE — 93351 STRESS TTE COMPLETE: CPT | Performed by: INTERNAL MEDICINE

## 2021-02-11 ENCOUNTER — TELEPHONE (OUTPATIENT)
Dept: CARDIOLOGY CLINIC | Age: 81
End: 2021-02-11

## 2021-02-11 NOTE — TELEPHONE ENCOUNTER
----- Message from Jessica Chicas NP sent at 2/11/2021  8:18 AM EST -----  Stress test ok. No evidence of blocked arteries in her heart. Above stress echo results given to patient.  2 pt identifiers used

## 2021-02-15 NOTE — PROGRESS NOTES
pls call- bone density shows osteoporosis in your wrist but your hip has osteopenia.  And spine is normal.  I would not take the meds for just your wrist.

## 2021-02-23 ENCOUNTER — HOSPITAL ENCOUNTER (OUTPATIENT)
Dept: MAMMOGRAPHY | Age: 81
Discharge: HOME | End: 2021-02-23
Attending: INTERNAL MEDICINE
Payer: MEDICARE

## 2021-02-23 PROCEDURE — 77063 BREAST TOMOSYNTHESIS BI: CPT

## 2021-03-17 ENCOUNTER — TELEPHONE (OUTPATIENT)
Dept: CARDIOLOGY CLINIC | Age: 81
End: 2021-03-17

## 2021-03-17 DIAGNOSIS — R00.2 PALPITATIONS: Primary | ICD-10-CM

## 2021-03-18 ENCOUNTER — TELEPHONE (OUTPATIENT)
Dept: CARDIOLOGY CLINIC | Age: 81
End: 2021-03-18

## 2021-03-18 NOTE — TELEPHONE ENCOUNTER
----- Message from Joel Macedo MD sent at 3/17/2021  2:16 PM EDT -----  Clair Burch to discuss?  ----- Message -----  From: Karina Berman  Sent: 3/17/2021   2:00 PM EDT  To: Joel Macedo MD      Called patient and scheduled a VV to discuss Aflutter found on monitor.      Scheduled for 3/19/21 @ 11 am.   2 pt identifiers used

## 2021-03-19 ENCOUNTER — VIRTUAL VISIT (OUTPATIENT)
Dept: CARDIOLOGY CLINIC | Age: 81
DRG: 511 | End: 2021-03-19
Payer: MEDICARE

## 2021-03-19 DIAGNOSIS — E78.2 MIXED HYPERLIPIDEMIA: ICD-10-CM

## 2021-03-19 DIAGNOSIS — R06.09 DYSPNEA ON EXERTION: ICD-10-CM

## 2021-03-19 DIAGNOSIS — I34.1 MITRAL VALVE PROLAPSE: Primary | ICD-10-CM

## 2021-03-19 DIAGNOSIS — Z86.73 HISTORY OF TIA (TRANSIENT ISCHEMIC ATTACK): ICD-10-CM

## 2021-03-19 DIAGNOSIS — R06.02 SHORTNESS OF BREATH: ICD-10-CM

## 2021-03-19 PROCEDURE — G9717 DOC PT DX DEP/BP F/U NT REQ: HCPCS | Performed by: INTERNAL MEDICINE

## 2021-03-19 PROCEDURE — 1100F PTFALLS ASSESS-DOCD GE2>/YR: CPT | Performed by: INTERNAL MEDICINE

## 2021-03-19 PROCEDURE — 3288F FALL RISK ASSESSMENT DOCD: CPT | Performed by: INTERNAL MEDICINE

## 2021-03-19 PROCEDURE — 1090F PRES/ABSN URINE INCON ASSESS: CPT | Performed by: INTERNAL MEDICINE

## 2021-03-19 PROCEDURE — G8427 DOCREV CUR MEDS BY ELIG CLIN: HCPCS | Performed by: INTERNAL MEDICINE

## 2021-03-19 PROCEDURE — G8399 PT W/DXA RESULTS DOCUMENT: HCPCS | Performed by: INTERNAL MEDICINE

## 2021-03-19 PROCEDURE — G0463 HOSPITAL OUTPT CLINIC VISIT: HCPCS | Performed by: INTERNAL MEDICINE

## 2021-03-19 PROCEDURE — 99214 OFFICE O/P EST MOD 30 MIN: CPT | Performed by: INTERNAL MEDICINE

## 2021-03-19 RX ORDER — DILTIAZEM HYDROCHLORIDE 120 MG/1
120 CAPSULE, COATED, EXTENDED RELEASE ORAL
Qty: 90 CAP | Refills: 3 | Status: SHIPPED | OUTPATIENT
Start: 2021-03-19 | End: 2021-03-19 | Stop reason: ALTCHOICE

## 2021-03-19 RX ORDER — METOPROLOL SUCCINATE 25 MG/1
12.5 TABLET, EXTENDED RELEASE ORAL
Qty: 90 TAB | Refills: 3 | Status: SHIPPED | OUTPATIENT
Start: 2021-03-19 | End: 2021-12-15

## 2021-03-19 NOTE — PROGRESS NOTES
Reason for consult: dyspnea   Requesting physician: Dr. Bertin Schroeder who was evaluated through a synchronous (real-time) audio-video encounter, and/or her healthcare decision maker, is aware that it is a billable service, with coverage as determined by her insurance carrier. She provided verbal consent to proceed: Yes, and patient identification was verified. It was conducted pursuant to the emergency declaration under the [facility], [facility] and the [employer] Act. A caregiver was present when appropriate. Ability to conduct physical exam was limited. I was at the office. The patient was at home. HPI: Madelin Johnson, a 79y.o. year-old who presents for evaluation of dyspnea. Aflutter on hr loop discussed today,   [address] recommendation     Stress echo ok, pac, no ischemia poor functional capacity exe 3:30  She will continue to work on her exercise capacity. We discussed today her prior history of A. fib and the occurrence of a flutter on her loop. As well as her personal risk factors for stroke. Given that she has had a TIA she is definitely at elevated risk for stroke from atrial arrhythmias. She would benefit from oral anticoagulation and we will begin that today. I am recommending Eliquis at 5 mg twice a day. She has had some shortness of breath and fatigue which is continued over the winter   At least some of it is related to the stress of her 's passing  passed in October and having a lot of stress with her family/daughters     No PND  No chest pain  Continues to have some intermittent palpitations  No syncope  Doing PT for balance and dizziness    Assessment/Plan:  1. Dyspnea with exertion -poor exercise capacity  2. Dyslipidemia - , advised her to work on diet and exercise   3. IGT - A1C 5.8%, advised her to work on diet and exercise   4.   Hx of MVP -not bad on stress echo  5. Hx of TIA in 2015 - had difficulty speaking, on ASA   6. GERD - on PPI  7. Hypothyroidism - on levothyroxine, recent TSH 5.3  8. Vitamin D deficiency - on supplementation  9. A. fib/flutter start NOAC Eliquis 5 mg twice daily  -hx of TIA and family history of AFib  -[id number]= 6    Fam Hx: mother had HTN, had MI at age 79, lived to age 80, father had a CVA, brother has Afib  Soc Hx: no tobacco use, no etoh use     She  has a past medical history of Asthma, Colon polyps, Colon polyps, Fracture (2017), GERD (gastroesophageal reflux disease), Glaucoma, Humeral fracture, Hyperlipidemia, Macular edema, Major depression in partial remission ([facility] Mitral valve prolapse, Osteopenia, and TIA (transient ischemic attack). Cardiovascular ROS: no chest pain or dyspnea on exertion  Respiratory ROS: no cough, shortness of breath, or wheezing  Neurological ROS: no TIA or stroke symptoms  All other systems negative except as above. PE  There were no vitals filed for this visit. There is no height or weight on file to calculate BMI.    General appearance - alert, well appearing, and in no distress  Mental status - affect appropriate to mood  No wheezing or respiratory distress no cough or congestion  Thought process logical speech is clear  Skin - normal coloration  no rashes    12 lead ECG:    Recent Labs:  Lab Results   Component Value Date/Time    Cholesterol, total 199 08/14/2020 01:53 PM    HDL Cholesterol 59 08/14/2020 01:53 PM    LDL, calculated 118 (H) 08/14/2020 01:53 PM    Triglyceride 108 08/14/2020 01:53 PM     Lab Results   Component Value Date/Time    Creatinine 0.93 08/14/2020 01:53 PM     Lab Results   Component Value Date/Time    BUN 17 08/14/2020 01:53 PM     Lab Results   Component Value Date/Time    Potassium 4.4 08/14/2020 01:53 PM     Lab Results   Component Value Date/Time    Hemoglobin A1c, External 5.8 01/09/2021     No results found for: HGB, HGBP, HGBEXT, HGBEXT  No results found for: PLT, PLTEXT, PLTEXT    Reviewed:  Past Medical History:   Diagnosis Date    Asthma     Colon polyps     tubular adenoma 2017    Colon polyps     Fracture 2017    Right humerus (pt fell)    GERD (gastroesophageal reflux disease)     Glaucoma     Humeral fracture     2017    Hyperlipidemia     Macular edema     Major depression in partial remission ([facility]     Mitral valve prolapse     Osteopenia     TIA (transient ischemic attack)      Social History     Tobacco Use   Smoking Status Former Smoker    Packs/day: 0.25    Years: 10.00    Pack years: 2.50    Types: Cigarettes   Smokeless Tobacco Never Used     Social History     Substance and Sexual Activity   Alcohol Use Yes    Alcohol/week: 7.0 standard drinks    Types: 7 Glasses of wine per week     No Known Allergies    Current Outpatient Medications   Medication Sig    levothyroxine (SYNTHROID) 50 mcg tablet TAKE ONE TABLET BY MOUTH EVERY MORNING BEFORE BREAKFAST    dorzolamide (TRUSOPT) 2 % ophthalmic solution Administer 2 Drops to both eyes three (3) times daily.  olopatadine (Pataday) 0.2 % drop ophthalmic solution Administer 1 Drop to both eyes daily.  citalopram (CELEXA) 20 mg tablet TAKE 1 AND 1/2 TABLETS BY MOUTH EVERY DAY.  omeprazole (PRILOSEC) 20 mg capsule TAKE 1 CAPSULE BY MOUTH EVERY DAY.  cholecalciferol, VITAMIN D3, (VITAMIN D3) 5,000 unit tab tablet Take  by mouth daily.  aspirin 81 mg chewable tablet Take 81 mg by mouth daily.  latanoprost (XALATAN) 0.005 % ophthalmic solution Administer 1 Drop to both eyes nightly. No current facility-administered medications for this visit.         Renzo Rider MD  [facility] [address] [facility] [address], [address] [facility] [address]  [address] [facility]  [phone]

## 2021-03-20 ENCOUNTER — APPOINTMENT (OUTPATIENT)
Dept: CT IMAGING | Age: 81
DRG: 511 | End: 2021-03-20
Attending: EMERGENCY MEDICINE
Payer: MEDICARE

## 2021-03-20 ENCOUNTER — HOSPITAL ENCOUNTER (INPATIENT)
Age: 81
LOS: 2 days | Discharge: HOME | DRG: 511 | End: 2021-03-22
Attending: EMERGENCY MEDICINE | Admitting: INTERNAL MEDICINE
Payer: MEDICARE

## 2021-03-20 ENCOUNTER — APPOINTMENT (OUTPATIENT)
Dept: GENERAL RADIOLOGY | Age: 81
DRG: 511 | End: 2021-03-20
Attending: EMERGENCY MEDICINE
Payer: MEDICARE

## 2021-03-20 ENCOUNTER — HOSPITAL ENCOUNTER (EMERGENCY)
Dept: GENERAL RADIOLOGY | Age: 81
Discharge: HOME | DRG: 511 | End: 2021-03-20
Attending: EMERGENCY MEDICINE
Payer: MEDICARE

## 2021-03-20 DIAGNOSIS — S53.006A FRACTURE OF PROXIMAL SHAFT OF ULNA WITH DISLOCATION OF HEAD OF RADIUS: Primary | ICD-10-CM

## 2021-03-20 DIAGNOSIS — S01.81XA FACIAL LACERATION, INITIAL ENCOUNTER: ICD-10-CM

## 2021-03-20 DIAGNOSIS — S42.402A LEFT ELBOW FRACTURE, CLOSED, INITIAL ENCOUNTER: ICD-10-CM

## 2021-03-20 DIAGNOSIS — S52.209A FRACTURE OF PROXIMAL SHAFT OF ULNA WITH DISLOCATION OF HEAD OF RADIUS: Primary | ICD-10-CM

## 2021-03-20 PROBLEM — S62.109A WRIST FRACTURE: Status: ACTIVE | Noted: 2021-03-20

## 2021-03-20 PROBLEM — S62.109A WRIST FRACTURE: Status: RESOLVED | Noted: 2021-03-20 | Resolved: 2021-03-20

## 2021-03-20 LAB
ALBUMIN SERPL-MCNC: 3.4 G/DL (ref 3.5–5)
ALBUMIN/GLOB SERPL: 0.9 {RATIO} (ref 1.1–2.2)
ALP SERPL-CCNC: 101 U/L (ref 45–117)
ALT SERPL-CCNC: 30 U/L (ref 12–78)
ANION GAP SERPL CALC-SCNC: 5 MMOL/L (ref 5–15)
AST SERPL-CCNC: 22 U/L (ref 15–37)
BASOPHILS # BLD: 0.1 K/UL (ref 0–0.1)
BASOPHILS NFR BLD: 1 % (ref 0–1)
BILIRUB SERPL-MCNC: 0.3 MG/DL (ref 0.2–1)
BUN SERPL-MCNC: 23 MG/DL (ref 6–20)
BUN/CREAT SERPL: 25 (ref 12–20)
CALCIUM SERPL-MCNC: 8.6 MG/DL (ref 8.5–10.1)
CHLORIDE SERPL-SCNC: 109 MMOL/L (ref 97–108)
CO2 SERPL-SCNC: 25 MMOL/L (ref 21–32)
COMMENT, HOLDF: NORMAL
CREAT SERPL-MCNC: 0.93 MG/DL (ref 0.55–1.02)
DIFFERENTIAL METHOD BLD: NORMAL
EOSINOPHIL # BLD: 0.1 K/UL (ref 0–0.4)
EOSINOPHIL NFR BLD: 2 % (ref 0–7)
ERYTHROCYTE [DISTWIDTH] IN BLOOD BY AUTOMATED COUNT: 13.2 % (ref 11.5–14.5)
GLOBULIN SER CALC-MCNC: 3.9 G/DL (ref 2–4)
GLUCOSE SERPL-MCNC: 81 MG/DL (ref 65–100)
HCT VFR BLD AUTO: 40.5 % (ref 35–47)
HGB BLD-MCNC: 13 G/DL (ref 11.5–16)
IMM GRANULOCYTES # BLD AUTO: 0 K/UL (ref 0–0.04)
IMM GRANULOCYTES NFR BLD AUTO: 0 % (ref 0–0.5)
LYMPHOCYTES # BLD: 2.4 K/UL (ref 0.8–3.5)
LYMPHOCYTES NFR BLD: 28 % (ref 12–49)
MCH RBC QN AUTO: 29.2 PG (ref 26–34)
MCHC RBC AUTO-ENTMCNC: 32.1 G/DL (ref 30–36.5)
MCV RBC AUTO: 91 FL (ref 80–99)
MONOCYTES # BLD: 0.6 K/UL (ref 0–1)
MONOCYTES NFR BLD: 7 % (ref 5–13)
NEUTS SEG # BLD: 5.4 K/UL (ref 1.8–8)
NEUTS SEG NFR BLD: 62 % (ref 32–75)
NRBC # BLD: 0 K/UL (ref 0–0.01)
NRBC BLD-RTO: 0 PER 100 WBC
PLATELET # BLD AUTO: 242 K/UL (ref 150–400)
PMV BLD AUTO: 10.2 FL (ref 8.9–12.9)
POTASSIUM SERPL-SCNC: 4 MMOL/L (ref 3.5–5.1)
PROT SERPL-MCNC: 7.3 G/DL (ref 6.4–8.2)
RBC # BLD AUTO: 4.45 M/UL (ref 3.8–5.2)
SAMPLES BEING HELD,HOLD: NORMAL
SODIUM SERPL-SCNC: 139 MMOL/L (ref 136–145)
WBC # BLD AUTO: 8.5 K/UL (ref 3.6–11)

## 2021-03-20 PROCEDURE — 73562 X-RAY EXAM OF KNEE 3: CPT

## 2021-03-20 PROCEDURE — 73030 X-RAY EXAM OF SHOULDER: CPT

## 2021-03-20 PROCEDURE — 99285 EMERGENCY DEPT VISIT HI MDM: CPT

## 2021-03-20 PROCEDURE — 73080 X-RAY EXAM OF ELBOW: CPT

## 2021-03-20 PROCEDURE — 74011000250 HC RX REV CODE- 250: Performed by: EMERGENCY MEDICINE

## 2021-03-20 PROCEDURE — 74011000250 HC RX REV CODE- 250: Performed by: INTERNAL MEDICINE

## 2021-03-20 PROCEDURE — 70450 CT HEAD/BRAIN W/O DYE: CPT

## 2021-03-20 PROCEDURE — 36415 COLL VENOUS BLD VENIPUNCTURE: CPT

## 2021-03-20 PROCEDURE — 80053 COMPREHEN METABOLIC PANEL: CPT

## 2021-03-20 PROCEDURE — 73110 X-RAY EXAM OF WRIST: CPT

## 2021-03-20 PROCEDURE — 74011250637 HC RX REV CODE- 250/637: Performed by: NURSE PRACTITIONER

## 2021-03-20 PROCEDURE — 65270000029 HC RM PRIVATE

## 2021-03-20 PROCEDURE — 96374 THER/PROPH/DIAG INJ IV PUSH: CPT

## 2021-03-20 PROCEDURE — 74011250636 HC RX REV CODE- 250/636: Performed by: EMERGENCY MEDICINE

## 2021-03-20 PROCEDURE — 99222 1ST HOSP IP/OBS MODERATE 55: CPT | Performed by: INTERNAL MEDICINE

## 2021-03-20 PROCEDURE — 85025 COMPLETE CBC W/AUTO DIFF WBC: CPT

## 2021-03-20 PROCEDURE — 74011250637 HC RX REV CODE- 250/637: Performed by: INTERNAL MEDICINE

## 2021-03-20 PROCEDURE — 75810000293 HC SIMP/SUPERF WND  RPR

## 2021-03-20 RX ORDER — LATANOPROST 50 UG/ML
1 SOLUTION/ DROPS OPHTHALMIC EVERY EVENING
Status: DISCONTINUED | OUTPATIENT
Start: 2021-03-21 | End: 2021-03-22 | Stop reason: HOSPADM

## 2021-03-20 RX ORDER — SODIUM CHLORIDE 0.9 % (FLUSH) 0.9 %
5-40 SYRINGE (ML) INJECTION AS NEEDED
Status: DISCONTINUED | OUTPATIENT
Start: 2021-03-20 | End: 2021-03-21

## 2021-03-20 RX ORDER — SODIUM CHLORIDE 0.9 % (FLUSH) 0.9 %
5-40 SYRINGE (ML) INJECTION EVERY 8 HOURS
Status: DISCONTINUED | OUTPATIENT
Start: 2021-03-20 | End: 2021-03-21

## 2021-03-20 RX ORDER — TRAMADOL HYDROCHLORIDE 50 MG/1
50 TABLET ORAL
Status: DISCONTINUED | OUTPATIENT
Start: 2021-03-20 | End: 2021-03-22 | Stop reason: HOSPADM

## 2021-03-20 RX ORDER — CITALOPRAM 20 MG/1
20 TABLET, FILM COATED ORAL DAILY
Status: DISCONTINUED | OUTPATIENT
Start: 2021-03-21 | End: 2021-03-20

## 2021-03-20 RX ORDER — ACETAMINOPHEN 325 MG/1
650 TABLET ORAL
Status: DISCONTINUED | OUTPATIENT
Start: 2021-03-20 | End: 2021-03-22 | Stop reason: HOSPADM

## 2021-03-20 RX ORDER — PROMETHAZINE HYDROCHLORIDE 25 MG/1
12.5 TABLET ORAL
Status: DISCONTINUED | OUTPATIENT
Start: 2021-03-20 | End: 2021-03-22 | Stop reason: HOSPADM

## 2021-03-20 RX ORDER — MORPHINE SULFATE 2 MG/ML
2 INJECTION, SOLUTION INTRAMUSCULAR; INTRAVENOUS
Status: COMPLETED | OUTPATIENT
Start: 2021-03-20 | End: 2021-03-20

## 2021-03-20 RX ORDER — POLYETHYLENE GLYCOL 3350 17 G/17G
17 POWDER, FOR SOLUTION ORAL DAILY PRN
Status: DISCONTINUED | OUTPATIENT
Start: 2021-03-20 | End: 2021-03-22 | Stop reason: HOSPADM

## 2021-03-20 RX ORDER — PANTOPRAZOLE SODIUM 40 MG/1
40 TABLET, DELAYED RELEASE ORAL
Status: DISCONTINUED | OUTPATIENT
Start: 2021-03-20 | End: 2021-03-22 | Stop reason: HOSPADM

## 2021-03-20 RX ORDER — LEVOTHYROXINE SODIUM 50 UG/1
50 TABLET ORAL
Status: DISCONTINUED | OUTPATIENT
Start: 2021-03-21 | End: 2021-03-22 | Stop reason: HOSPADM

## 2021-03-20 RX ORDER — ONDANSETRON 2 MG/ML
4 INJECTION INTRAMUSCULAR; INTRAVENOUS
Status: DISCONTINUED | OUTPATIENT
Start: 2021-03-20 | End: 2021-03-22 | Stop reason: HOSPADM

## 2021-03-20 RX ORDER — DORZOLAMIDE HCL 20 MG/ML
2 SOLUTION/ DROPS OPHTHALMIC 3 TIMES DAILY
Status: DISCONTINUED | OUTPATIENT
Start: 2021-03-20 | End: 2021-03-22 | Stop reason: HOSPADM

## 2021-03-20 RX ORDER — MELATONIN
5000 DAILY
Status: DISCONTINUED | OUTPATIENT
Start: 2021-03-21 | End: 2021-03-21

## 2021-03-20 RX ORDER — KETOTIFEN FUMARATE 0.35 MG/ML
1 SOLUTION/ DROPS OPHTHALMIC 2 TIMES DAILY
Status: DISCONTINUED | OUTPATIENT
Start: 2021-03-21 | End: 2021-03-22 | Stop reason: HOSPADM

## 2021-03-20 RX ORDER — ACETAMINOPHEN 650 MG/1
650 SUPPOSITORY RECTAL
Status: DISCONTINUED | OUTPATIENT
Start: 2021-03-20 | End: 2021-03-22 | Stop reason: HOSPADM

## 2021-03-20 RX ORDER — METOPROLOL SUCCINATE 25 MG/1
12.5 TABLET, EXTENDED RELEASE ORAL
Status: DISCONTINUED | OUTPATIENT
Start: 2021-03-20 | End: 2021-03-22 | Stop reason: HOSPADM

## 2021-03-20 RX ORDER — CITALOPRAM 20 MG/1
30 TABLET, FILM COATED ORAL DAILY
Status: DISCONTINUED | OUTPATIENT
Start: 2021-03-21 | End: 2021-03-21

## 2021-03-20 RX ADMIN — METOPROLOL SUCCINATE 12.5 MG: 25 TABLET, EXTENDED RELEASE ORAL at 22:30

## 2021-03-20 RX ADMIN — TRAMADOL HYDROCHLORIDE 50 MG: 50 TABLET, FILM COATED ORAL at 22:42

## 2021-03-20 RX ADMIN — PANTOPRAZOLE SODIUM 40 MG: 40 TABLET, DELAYED RELEASE ORAL at 22:30

## 2021-03-20 RX ADMIN — Medication 2 ML: at 17:02

## 2021-03-20 RX ADMIN — DORZOLAMIDE HYDROCHLORIDE 2 DROP: 20 SOLUTION/ DROPS OPHTHALMIC at 22:30

## 2021-03-20 RX ADMIN — MORPHINE SULFATE 2 MG: 2 INJECTION, SOLUTION INTRAMUSCULAR; INTRAVENOUS at 19:16

## 2021-03-20 NOTE — Clinical Note
Status[de-identified] INPATIENT [101]   Type of Bed: Orthopedics [13]   Inpatient Hospitalization Certified Necessary for the Following Reasons: 9.  Other (further clarification in H&P documentation)   Admitting Diagnosis: Wrist fracture [[id number]]   Admitting Physician: Jaylin Garcia   Attending Physician: Med Manzanares [[id number]]   Estimated Length of Stay: 2 Midnights   Discharge Plan[de-identified] Home with Office Follow-up

## 2021-03-20 NOTE — ED NOTES
79year old female pt comes to the ER via squad for a C/C of a Fall and facial laceration. Pt stated \"I was walking outside my apartment when I tripped over the curb hitting my head\". Pt stated that she did not LOC. Pt has a 2 inch laceration above her left eye brow. Bleeding was controlled before on EMS was on scene. Pt states that she also has left elbow pain stating her pain is a 8 out of 10. Pt state that she has just been prescribed blood thinners but she hasn't picked up the prescription yet. She took 81 mg baby aspirin today. Pt is A&Ox4.

## 2021-03-20 NOTE — CONSULTS
Reason for consult: dyspnea new afib  Requesting physician: Dr. Kita Llanos     HPI: Jael Herndon, a 79y.o. year-old who presents for evaluation of dyspnea. Aflutter on hr loop discussed just this week at her office visit. [address] recommendation was made. Now concern for fall, tripped and hit head has facial laceration  NOAC would be less safe than previously thought. High risk for CVA with eelvated CHADS2 vasc score  Will need to hold off noac at least 1-2 weeks. Discuss Watchman. Stress echo ok, pac, no ischemia poor functional capacity exe 3:30  She will continue to work on her exercise capacity. We discussed today her prior history of A. fib and the occurrence of a flutter on her loop. As well as her personal risk factors for stroke. Given that she has had a TIA she is definitely at elevated risk for stroke from atrial arrhythmias. Now with acute fall will hold off. She has had some shortness of breath and fatigue which is continued over the winter   At least some of it is related to the stress of her 's passing  passed in October and having a lot of stress with her family/daughters     No PND  No chest pain  Continues to have some intermittent palpitations  No syncope  Doing PT for balance and dizziness    Assessment/Plan:  1. Dyspnea with exertion -poor exercise capacity  2. Dyslipidemia - , advised her to work on diet and exercise   3. IGT - A1C 5.8%, advised her to work on diet and exercise   4. Hx of MVP -not bad on stress echo  5. Hx of TIA in 2015 - had difficulty speaking, on ASA   6. GERD - on PPI  7. Hypothyroidism - on levothyroxine, recent TSH 5.3  8. Vitamin D deficiency - on supplementation  9.   A. fib/flutter start NOAC Eliquis 5 mg twice daily-> defer 1-2 weeks now with her fall  -hx of TIA and family history of AFib  -[id number]= 6    Fam Hx: mother had HTN, had MI at age 79, lived to age 80, father had a CVA, brother has Afib  Soc Hx: no tobacco use, no etoh use     She  has a past medical history of Asthma, Atrial fibrillation ([facility] (03/16/2021), Colon polyps, Colon polyps, Fracture (2017), GERD (gastroesophageal reflux disease), Glaucoma, Humeral fracture, Hyperlipidemia, Macular edema, Major depression in partial remission ([facility] Mitral valve prolapse, Osteopenia, and TIA (transient ischemic attack). Cardiovascular ROS: no chest pain or dyspnea on exertion  Respiratory ROS: no cough, shortness of breath, or wheezing  Neurological ROS: no TIA or stroke symptoms  All other systems negative except as above. PE  Vitals:    03/20/21 1606   BP: (!) 165/67   Pulse: 82   Resp: 16   Temp: 97.8 °F (36.6 °C)   SpO2: 96%   Weight: 185 lb (83.9 kg)   Height: 5' 5\" (1.651 m)    Body mass index is 30.79 kg/m².    General appearance - alert, well appearing, and in no distress  Mental status - affect appropriate to mood  No wheezing or respiratory distress no cough or congestion  Thought process logical speech is clear  Skin - normal coloration  no rashes    12 lead ECG:    Recent Labs:  Lab Results   Component Value Date/Time    Cholesterol, total 199 08/14/2020 01:53 PM    HDL Cholesterol 59 08/14/2020 01:53 PM    LDL, calculated 118 (H) 08/14/2020 01:53 PM    Triglyceride 108 08/14/2020 01:53 PM     Lab Results   Component Value Date/Time    Creatinine 0.93 08/14/2020 01:53 PM     Lab Results   Component Value Date/Time    BUN 17 08/14/2020 01:53 PM     Lab Results   Component Value Date/Time    Potassium 4.4 08/14/2020 01:53 PM     Lab Results   Component Value Date/Time    Hemoglobin A1c, External 5.8 01/09/2021     Lab Results   Component Value Date/Time    HGB 13.0 03/20/2021 04:18 PM     Lab Results   Component Value Date/Time    PLATELET 501 01/53/4853 04:18 PM       Reviewed:  Past Medical History:   Diagnosis Date    Asthma     Atrial fibrillation ([facility] 03/16/2021    Colon polyps     tubular adenoma 2017    Colon polyps     Fracture 2017 Right humerus (pt fell)    GERD (gastroesophageal reflux disease)     Glaucoma     Humeral fracture     2017    Hyperlipidemia     Macular edema     Major depression in partial remission ([address]     Mitral valve prolapse     Osteopenia     TIA (transient ischemic attack)      Social History     Tobacco Use   Smoking Status Former Smoker    Packs/day: 0.25    Years: 10.00    Pack years: 2.50    Types: Cigarettes   Smokeless Tobacco Never Used     Social History     Substance and Sexual Activity   Alcohol Use Yes    Alcohol/week: 7.0 standard drinks    Types: 7 Glasses of wine per week     No Known Allergies    Current Facility-Administered Medications   Medication Dose Route Frequency    lidocaine/EPINEPHrine/tetracaine/methylcellulose (LET) topical gel gel 2 mL  2 mL Topical NOW     Current Outpatient Medications   Medication Sig    apixaban (ELIQUIS) 5 mg tablet Take 1 Tab by mouth two (2) times a day.  metoprolol succinate (Toprol XL) 25 mg XL tablet Take 0.5 Tabs by mouth nightly. Indications: high blood pressure, multifocal atrial tachycardia, an unusually high heart rate at rest, paroxysmal supraventricular tachycardia    levothyroxine (SYNTHROID) 50 mcg tablet TAKE ONE TABLET BY MOUTH EVERY MORNING BEFORE BREAKFAST    dorzolamide (TRUSOPT) 2 % ophthalmic solution Administer 2 Drops to both eyes three (3) times daily.  olopatadine (Pataday) 0.2 % drop ophthalmic solution Administer 1 Drop to both eyes daily.  citalopram (CELEXA) 20 mg tablet TAKE 1 AND 1/2 TABLETS BY MOUTH EVERY DAY.  omeprazole (PRILOSEC) 20 mg capsule TAKE 1 CAPSULE BY MOUTH EVERY DAY.  cholecalciferol, VITAMIN D3, (VITAMIN D3) 5,000 unit tab tablet Take  by mouth daily.  aspirin 81 mg chewable tablet Take 81 mg by mouth daily.  latanoprost (XALATAN) 0.005 % ophthalmic solution Administer 1 Drop to both eyes nightly.        Donovan Yanes MD  [facility] [address] [place] [address] [address]  Tiffanie Jolley  [phone]

## 2021-03-20 NOTE — ED PROVIDER NOTES
Ms. Natalia Razo is an 80yo female who presents to the ER after a fall. She said that she was feeling well prior to the fall. She tripped while walking and fell face first onto concrete. No loss of consciousness. She denies any neck pain. No numbness, tingling, weakness. She is able to walk after the fall. No chest pain or trouble breathing. Does report pain at her left arm and not her head. She said that she is not on any blood thinners. She is to start Eliquis tomorrow. No changes with her urine or bowel movements. She denies any other complaints.            Past Medical History:   Diagnosis Date    Asthma     Atrial fibrillation ([facility] 2021    Colon polyps     tubular adenoma 2017    Colon polyps     Fracture 2017    Right humerus (pt fell)    GERD (gastroesophageal reflux disease)     Glaucoma     Humeral fracture     2017    Hyperlipidemia     Macular edema     Major depression in partial remission ([address]     Mitral valve prolapse     Osteopenia     TIA (transient ischemic attack)        Past Surgical History:   Procedure Laterality Date    COLONOSCOPY N/A 2020    COLONOSCOPY performed by Irina Mariano [facility] at [facility] ENDOSCOPY    HX BREAST BIOPSY Left     benign surgical bx done yrs ago    HX CATARACT REMOVAL      HX  SECTION      HX CHOLECYSTECTOMY      HX COLONOSCOPY      ,     HX TONSILLECTOMY      HX WISDOM TEETH EXTRACTION           Family History:   Problem Relation Age of Onset    Hypertension Mother 80    Coronary Artery Disease Mother     Alcohol abuse Father     Stroke Father     Arrhythmia Brother          a fib    Hypertension Brother     Cancer Maternal Grandmother        Social History     Socioeconomic History    Marital status:      Spouse name: Not on file    Number of children: Not on file    Years of education: Not on file    Highest education level: Not on file   Occupational History    Occupation: retired english professor Raul Rolle strain: Not on file    Food insecurity     Worry: Not on file     Inability: Not on file   [employer] needs     Medical: Not on file     Non-medical: Not on file   Tobacco Use    Smoking status: Former Smoker     Packs/day: 0.25     Years: 10.00     Pack years: 2.50     Types: Cigarettes    Smokeless tobacco: Never Used   Substance and Sexual Activity    Alcohol use: Yes     Alcohol/week: 7.0 standard drinks     Types: 7 Glasses of wine per week    Drug use: Yes     Types: Prescription, OTC    Sexual activity: Not on file   Lifestyle    Physical activity     Days per week: Not on file     Minutes per session: Not on file    Stress: Not on file   Relationships    Social connections     Talks on phone: Not on file     Gets together: Not on file     Attends Taoism service: Not on file     Active member of club or organization: Not on file     Attends meetings of clubs or organizations: Not on file     Relationship status: Not on file    Intimate partner violence     Fear of current or ex partner: Not on file     Emotionally abused: Not on file     Physically abused: Not on file     Forced sexual activity: Not on file   Other Topics Concern    Not on file   Social History Narrative    Not on file         ALLERGIES: Patient has no known allergies. Review of Systems   Constitutional: Negative for chills and fever. HENT: Negative for rhinorrhea and sore throat. Respiratory: Negative for cough and shortness of breath. Cardiovascular: Negative for chest pain. Gastrointestinal: Negative for abdominal pain, diarrhea, nausea and vomiting. Genitourinary: Negative for dysuria and urgency. Musculoskeletal:        Left arm pain   Skin: Positive for wound. Negative for rash. Neurological: Negative for dizziness, weakness and light-headedness.        Vitals:    03/20/21 1606   BP: (!) 165/67   Pulse: 82   Resp: 16   Temp: 97.8 °F (36.6 °C)   SpO2: 96%   Weight: 83.9 kg (185 lb)   Height: 5' 5\" (1.651 m)            Physical Exam     Vital signs reviewed. Nursing notes reviewed. Const:  No acute distress, well developed, well nourished  Head: Abrasions, bruising, and swelling over the left forehead, laceration of the left eyebrow  Eyes:  PERRL, conjunctiva normal, no scleral icterus  Neck:  Supple, trachea midline  Cardiovascular: Regular rate  Resp:  No resp distress, no increased work of breathing  Abd:  Soft, non-tender, non-distended  MSK:  No pedal edema, normal ROM, tenderness palpation and pain with range of motion of the left elbow, mild tenderness to palpation of the left shoulder and left wrist  Neuro:  Alert and oriented x3, no cranial nerve defect  Skin: 2 inch laceration of the left eyebrow  Psych: normal mood and affect, behavior is normal, judgement and thought content is normal          MDM  Number of Diagnoses or Management Options     Amount and/or Complexity of Data Reviewed  Clinical lab tests: ordered and reviewed  Tests in the radiology section of CPT®: ordered and reviewed  Review and summarize past medical records: yes    Patient Progress  Patient progress: stable         Wound Repair    Date/Time: 3/20/2021 7:00 PM  Performed by: attendingPre-procedure re-eval: Immediately prior to the procedure, the patient was reevaluated and found suitable for the planned procedure and any planned medications. Time out: Immediately prior to the procedure a time out was called to verify the correct patient, procedure, equipment, staff and marking as appropriate. .  Location details: face  Wound length:2.6 - 7.5 cm    Anesthesia:  Local Anesthetic: LET (lido, epi, tetracaine)  Foreign bodies: no foreign bodies  Irrigation solution: saline  Debridement: none  Skin closure: Prolene (6-0)  Wound subcutaneous closure material used: Chromic gut.   Number of sutures: 9  Technique: simple  Approximation: close  Lip approximation: vermillion border well aligned  Dressing: 4x4  Patient tolerance: patient tolerated the procedure well with no immediate complications  My total time at bedside, performing this procedure was 1-15 minutes. Perfect Serve Consult for Admission  8:30 PM    ED Room Number: ER05/05  Patient Name and age:  Cherelle Fink 79 y.o.  female  Working Diagnosis:   1. Fracture of proximal shaft of ulna with dislocation of head of radius    2. Facial laceration, initial encounter        COVID-19 Suspicion:  no  Sepsis present:  no  Reassessment needed: no  Readmission: no  Isolation Requirements:  no  Recommended Level of Care:  med/surg  Department:[facility] Adult ED - 21   Other:  Spoke with ortho, who have seen her. They recommended medical admit due to underlying medical issues. Plan to go to the OR tomorrow. Ms. Payton Henry is an 80yo female who presents to the ER with complaints of elbow pain s/p fall. Xray shows fx of ulna and dislocation of the radius. No fx or bleed on head CT. I have sutured her wound.   Pt. Seen by ortho and to be evaluated for admission by the hospitalist.

## 2021-03-20 NOTE — PROGRESS NOTES
Admission Medication Reconciliation:    Information obtained from:  patient, rx query, chart review, virtual visit note from 3/19/21  RxQuery data available¹:  YES    Comments/Recommendations: Updated PTA meds/reviewed patient's allergies. 1)  Of note, apixaban and metoprolol prescribed yesterday via a virtual visit. 2)  Medication changes (since last review): Added  - none    Adjusted  - none    Removed  - none   ¹RxQuery pharmacy benefit data reflects medications filled and processed through the patient's insurance, however   this data does NOT capture whether the medication was picked up or is currently being taken by the patient. Allergies:  Patient has no known allergies. Significant PMH/Disease States:   Past Medical History:   Diagnosis Date    Asthma     Atrial fibrillation ([facility] 03/16/2021    Colon polyps     tubular adenoma 2017    Colon polyps     Fracture 2017    Right humerus (pt fell)    GERD (gastroesophageal reflux disease)     Glaucoma     Humeral fracture     2017    Hyperlipidemia     Macular edema     Major depression in partial remission ([facility]     Mitral valve prolapse     Osteopenia     TIA (transient ischemic attack)      Chief Complaint for this Admission:    Chief Complaint   Patient presents with    Fall     Prior to Admission Medications:   Prior to Admission Medications   Prescriptions Last Dose Informant Taking? apixaban (ELIQUIS) 5 mg tablet   Yes   Sig: Take 1 Tab by mouth two (2) times a day. aspirin 81 mg chewable tablet   Yes   Sig: Take 81 mg by mouth daily. cholecalciferol, VITAMIN D3, (VITAMIN D3) 5,000 unit tab tablet   Yes   Sig: Take  by mouth daily. citalopram (CELEXA) 20 mg tablet   Yes   Sig: TAKE 1 AND 1/2 TABLETS BY MOUTH EVERY DAY. dorzolamide (TRUSOPT) 2 % ophthalmic solution   Yes   Sig: Administer 2 Drops to both eyes three (3) times daily. latanoprost (XALATAN) 0.005 % ophthalmic solution   Yes   Sig: Administer 1 Drop to both eyes nightly. levothyroxine (SYNTHROID) 50 mcg tablet   Yes   Sig: TAKE ONE TABLET BY MOUTH EVERY MORNING BEFORE BREAKFAST   metoprolol succinate (Toprol XL) 25 mg XL tablet   Yes   Sig: Take 0.5 Tabs by mouth nightly. Indications: high blood pressure, multifocal atrial tachycardia, an unusually high heart rate at rest, paroxysmal supraventricular tachycardia   olopatadine (Pataday) 0.2 % drop ophthalmic solution Unknown at Unknown time  No   Sig: Administer 1 Drop to both eyes daily. omeprazole (PRILOSEC) 20 mg capsule   Yes   Sig: TAKE 1 CAPSULE BY MOUTH EVERY DAY. Facility-Administered Medications: None     Please contact the main inpatient pharmacy with any questions or concerns at [phone] and we will direct you to the clinical pharmacist covering this patient's care while in-house.    Ian Grey

## 2021-03-21 ENCOUNTER — ANESTHESIA (OUTPATIENT)
Dept: SURGERY | Age: 81
DRG: 511 | End: 2021-03-21
Payer: MEDICARE

## 2021-03-21 ENCOUNTER — APPOINTMENT (OUTPATIENT)
Dept: GENERAL RADIOLOGY | Age: 81
DRG: 511 | End: 2021-03-21
Attending: HOSPITALIST
Payer: MEDICARE

## 2021-03-21 ENCOUNTER — ANESTHESIA EVENT (OUTPATIENT)
Dept: SURGERY | Age: 81
DRG: 511 | End: 2021-03-21
Payer: MEDICARE

## 2021-03-21 ENCOUNTER — APPOINTMENT (OUTPATIENT)
Dept: CT IMAGING | Age: 81
DRG: 511 | End: 2021-03-21
Attending: INTERNAL MEDICINE
Payer: MEDICARE

## 2021-03-21 LAB
ANION GAP SERPL CALC-SCNC: 6 MMOL/L (ref 5–15)
ATRIAL RATE: 66 BPM
BASOPHILS # BLD: 0 K/UL (ref 0–0.1)
BASOPHILS NFR BLD: 0 % (ref 0–1)
BUN SERPL-MCNC: 23 MG/DL (ref 6–20)
BUN/CREAT SERPL: 27 (ref 12–20)
CALCIUM SERPL-MCNC: 8.4 MG/DL (ref 8.5–10.1)
CALCULATED P AXIS, ECG09: 37 DEGREES
CALCULATED R AXIS, ECG10: 3 DEGREES
CALCULATED T AXIS, ECG11: 24 DEGREES
CHLORIDE SERPL-SCNC: 108 MMOL/L (ref 97–108)
CO2 SERPL-SCNC: 23 MMOL/L (ref 21–32)
COVID-19 RAPID TEST, COVR: NOT DETECTED
CREAT SERPL-MCNC: 0.84 MG/DL (ref 0.55–1.02)
DIAGNOSIS, 93000: NORMAL
DIFFERENTIAL METHOD BLD: ABNORMAL
EOSINOPHIL # BLD: 0 K/UL (ref 0–0.4)
EOSINOPHIL NFR BLD: 0 % (ref 0–7)
ERYTHROCYTE [DISTWIDTH] IN BLOOD BY AUTOMATED COUNT: 13 % (ref 11.5–14.5)
GLUCOSE SERPL-MCNC: 122 MG/DL (ref 65–100)
HCT VFR BLD AUTO: 40.2 % (ref 35–47)
HGB BLD-MCNC: 12.9 G/DL (ref 11.5–16)
IMM GRANULOCYTES # BLD AUTO: 0.1 K/UL (ref 0–0.04)
IMM GRANULOCYTES NFR BLD AUTO: 0 % (ref 0–0.5)
LYMPHOCYTES # BLD: 2.2 K/UL (ref 0.8–3.5)
LYMPHOCYTES NFR BLD: 18 % (ref 12–49)
MCH RBC QN AUTO: 29.7 PG (ref 26–34)
MCHC RBC AUTO-ENTMCNC: 32.1 G/DL (ref 30–36.5)
MCV RBC AUTO: 92.6 FL (ref 80–99)
MONOCYTES # BLD: 0.7 K/UL (ref 0–1)
MONOCYTES NFR BLD: 6 % (ref 5–13)
NEUTS SEG # BLD: 9.1 K/UL (ref 1.8–8)
NEUTS SEG NFR BLD: 76 % (ref 32–75)
NRBC # BLD: 0 K/UL (ref 0–0.01)
NRBC BLD-RTO: 0 PER 100 WBC
P-R INTERVAL, ECG05: 184 MS
PLATELET # BLD AUTO: 264 K/UL (ref 150–400)
PMV BLD AUTO: 10.5 FL (ref 8.9–12.9)
POTASSIUM SERPL-SCNC: 4 MMOL/L (ref 3.5–5.1)
Q-T INTERVAL, ECG07: 454 MS
QRS DURATION, ECG06: 78 MS
QTC CALCULATION (BEZET), ECG08: 475 MS
RBC # BLD AUTO: 4.34 M/UL (ref 3.8–5.2)
SODIUM SERPL-SCNC: 137 MMOL/L (ref 136–145)
SOURCE, COVRS: NORMAL
VENTRICULAR RATE, ECG03: 66 BPM
WBC # BLD AUTO: 12.1 K/UL (ref 3.6–11)

## 2021-03-21 PROCEDURE — 70480 CT ORBIT/EAR/FOSSA W/O DYE: CPT

## 2021-03-21 PROCEDURE — 77030008847 HC WRE K SYNT -A: Performed by: ORTHOPAEDIC SURGERY

## 2021-03-21 PROCEDURE — 77030002933 HC SUT MCRYL J&J -A: Performed by: ORTHOPAEDIC SURGERY

## 2021-03-21 PROCEDURE — 77030039497 HC CST PAD STERILE CHCS -A: Performed by: ORTHOPAEDIC SURGERY

## 2021-03-21 PROCEDURE — 76210000006 HC OR PH I REC 0.5 TO 1 HR: Performed by: ORTHOPAEDIC SURGERY

## 2021-03-21 PROCEDURE — 77030031139 HC SUT VCRL2 J&J -A: Performed by: ORTHOPAEDIC SURGERY

## 2021-03-21 PROCEDURE — 74011250637 HC RX REV CODE- 250/637: Performed by: ORTHOPAEDIC SURGERY

## 2021-03-21 PROCEDURE — C1713 ANCHOR/SCREW BN/BN,TIS/BN: HCPCS | Performed by: ORTHOPAEDIC SURGERY

## 2021-03-21 PROCEDURE — 77030000031 HC BIT DRL QC SYNT -C: Performed by: ORTHOPAEDIC SURGERY

## 2021-03-21 PROCEDURE — 74011250636 HC RX REV CODE- 250/636: Performed by: ORTHOPAEDIC SURGERY

## 2021-03-21 PROCEDURE — 74011250636 HC RX REV CODE- 250/636: Performed by: NURSE ANESTHETIST, CERTIFIED REGISTERED

## 2021-03-21 PROCEDURE — 76060000036 HC ANESTHESIA 2.5 TO 3 HR: Performed by: ORTHOPAEDIC SURGERY

## 2021-03-21 PROCEDURE — 2709999900 HC NON-CHARGEABLE SUPPLY: Performed by: ORTHOPAEDIC SURGERY

## 2021-03-21 PROCEDURE — 77030009023 HC CAP PROTCT PIN MCRA -A: Performed by: ORTHOPAEDIC SURGERY

## 2021-03-21 PROCEDURE — 74011250636 HC RX REV CODE- 250/636: Performed by: ANESTHESIOLOGY

## 2021-03-21 PROCEDURE — 77030026438 HC STYL ET INTUB CARD -A: Performed by: ANESTHESIOLOGY

## 2021-03-21 PROCEDURE — 80048 BASIC METABOLIC PNL TOTAL CA: CPT

## 2021-03-21 PROCEDURE — 73070 X-RAY EXAM OF ELBOW: CPT

## 2021-03-21 PROCEDURE — 74011250636 HC RX REV CODE- 250/636: Performed by: PHYSICIAN ASSISTANT

## 2021-03-21 PROCEDURE — 0PSL04Z REPOSITION LEFT ULNA WITH INTERNAL FIXATION DEVICE, OPEN APPROACH: ICD-10-PCS | Performed by: ORTHOPAEDIC SURGERY

## 2021-03-21 PROCEDURE — 77030008684 HC TU ET CUF COVD -B: Performed by: ANESTHESIOLOGY

## 2021-03-21 PROCEDURE — 85025 COMPLETE CBC W/AUTO DIFF WBC: CPT

## 2021-03-21 PROCEDURE — 76010000131 HC OR TIME 2 TO 2.5 HR: Performed by: ORTHOPAEDIC SURGERY

## 2021-03-21 PROCEDURE — 74011000250 HC RX REV CODE- 250: Performed by: PHYSICIAN ASSISTANT

## 2021-03-21 PROCEDURE — 93005 ELECTROCARDIOGRAM TRACING: CPT

## 2021-03-21 PROCEDURE — 77030003862 HC BIT DRL SYNT -B: Performed by: ORTHOPAEDIC SURGERY

## 2021-03-21 PROCEDURE — 87635 SARS-COV-2 COVID-19 AMP PRB: CPT

## 2021-03-21 PROCEDURE — 36415 COLL VENOUS BLD VENIPUNCTURE: CPT

## 2021-03-21 PROCEDURE — 74011250637 HC RX REV CODE- 250/637: Performed by: NURSE PRACTITIONER

## 2021-03-21 PROCEDURE — 74011000250 HC RX REV CODE- 250: Performed by: ORTHOPAEDIC SURGERY

## 2021-03-21 PROCEDURE — 77030008462 HC STPLR SKN PROX J&J -A: Performed by: ORTHOPAEDIC SURGERY

## 2021-03-21 PROCEDURE — 74011000250 HC RX REV CODE- 250: Performed by: NURSE ANESTHETIST, CERTIFIED REGISTERED

## 2021-03-21 PROCEDURE — 74011250637 HC RX REV CODE- 250/637: Performed by: INTERNAL MEDICINE

## 2021-03-21 PROCEDURE — 77030013079 HC BLNKT BAIR HGGR 3M -A: Performed by: ANESTHESIOLOGY

## 2021-03-21 PROCEDURE — 65270000029 HC RM PRIVATE

## 2021-03-21 PROCEDURE — 77030020754 HC CUF TRNQT 2BLA STRY -B: Performed by: ORTHOPAEDIC SURGERY

## 2021-03-21 DEVICE — SCREW BNE L46MM DIA2.7MM ANK S STL ST VAR ANG LOK FULL THRD: Type: IMPLANTABLE DEVICE | Site: ELBOW | Status: FUNCTIONAL

## 2021-03-21 DEVICE — SCREW BNE L44MM DIA2.7MM ANK S STL ST VAR ANG LOK FULL THRD: Type: IMPLANTABLE DEVICE | Site: ELBOW | Status: FUNCTIONAL

## 2021-03-21 DEVICE — 2.7MM METAPHYSEAL SCR SLF-TPNG W/T8 STRDRV RECESS/20MM: Type: IMPLANTABLE DEVICE | Site: ELBOW | Status: FUNCTIONAL

## 2021-03-21 DEVICE — SCREW BNE L24MM DIA2.7MM ANK S STL ST VAR ANG LOK FULL THRD: Type: IMPLANTABLE DEVICE | Site: ELBOW | Status: FUNCTIONAL

## 2021-03-21 DEVICE — 3.5MM CORTEX SCREW SELF-TAPPING 18MM: Type: IMPLANTABLE DEVICE | Site: ELBOW | Status: FUNCTIONAL

## 2021-03-21 DEVICE — 2.7MM VA LCKNG SCREW SLF-TPNG WITH T8 STARDRIVE RECESS 26MM: Type: IMPLANTABLE DEVICE | Site: ELBOW | Status: FUNCTIONAL

## 2021-03-21 DEVICE — SCREW BNE L16MM DIA3.5MM CORT S STL ST NONCANNULATED LOK: Type: IMPLANTABLE DEVICE | Site: ELBOW | Status: FUNCTIONAL

## 2021-03-21 DEVICE — SCREW BNE L42MM DIA2.7MM ANK S STL ST VAR ANG LOK FULL THRD: Type: IMPLANTABLE DEVICE | Site: ELBOW | Status: FUNCTIONAL

## 2021-03-21 DEVICE — PLATE BNE L116MM 4 H L OLECRANON S STL VAR ANG LOK COMPR: Type: IMPLANTABLE DEVICE | Site: ELBOW | Status: FUNCTIONAL

## 2021-03-21 DEVICE — SCREW BNE L20MM DIA3.5MM CORT S STL ST NONCANNULATED LOK: Type: IMPLANTABLE DEVICE | Site: ELBOW | Status: FUNCTIONAL

## 2021-03-21 DEVICE — SCREW BNE L38MM DIA2.7MM S STL ST VAR ANG LOK FULL THRD T8: Type: IMPLANTABLE DEVICE | Site: ELBOW | Status: FUNCTIONAL

## 2021-03-21 DEVICE — SCREW BNE L34MM DIA2.7MM S STL ST VAR ANG LOK FULL THRD T8: Type: IMPLANTABLE DEVICE | Site: ELBOW | Status: FUNCTIONAL

## 2021-03-21 RX ORDER — NALOXONE HYDROCHLORIDE 0.4 MG/ML
0.4 INJECTION, SOLUTION INTRAMUSCULAR; INTRAVENOUS; SUBCUTANEOUS AS NEEDED
Status: DISCONTINUED | OUTPATIENT
Start: 2021-03-21 | End: 2021-03-22 | Stop reason: HOSPADM

## 2021-03-21 RX ORDER — SODIUM CHLORIDE, SODIUM LACTATE, POTASSIUM CHLORIDE, CALCIUM CHLORIDE 600; 310; 30; 20 MG/100ML; MG/100ML; MG/100ML; MG/100ML
INJECTION, SOLUTION INTRAVENOUS
Status: DISCONTINUED | OUTPATIENT
Start: 2021-03-21 | End: 2021-03-21 | Stop reason: HOSPADM

## 2021-03-21 RX ORDER — FACIAL-BODY WIPES
10 EACH TOPICAL DAILY PRN
Status: DISCONTINUED | OUTPATIENT
Start: 2021-03-23 | End: 2021-03-22 | Stop reason: HOSPADM

## 2021-03-21 RX ORDER — MIDAZOLAM HYDROCHLORIDE 1 MG/ML
1 INJECTION, SOLUTION INTRAMUSCULAR; INTRAVENOUS AS NEEDED
Status: DISCONTINUED | OUTPATIENT
Start: 2021-03-21 | End: 2021-03-21 | Stop reason: HOSPADM

## 2021-03-21 RX ORDER — SODIUM CHLORIDE 0.9 % (FLUSH) 0.9 %
5-40 SYRINGE (ML) INJECTION EVERY 8 HOURS
Status: DISCONTINUED | OUTPATIENT
Start: 2021-03-21 | End: 2021-03-22 | Stop reason: HOSPADM

## 2021-03-21 RX ORDER — ASPIRIN 81 MG/1
81 TABLET ORAL DAILY
Status: DISCONTINUED | OUTPATIENT
Start: 2021-03-22 | End: 2021-03-21 | Stop reason: SDUPTHER

## 2021-03-21 RX ORDER — GLYCOPYRROLATE 0.2 MG/ML
INJECTION INTRAMUSCULAR; INTRAVENOUS AS NEEDED
Status: DISCONTINUED | OUTPATIENT
Start: 2021-03-21 | End: 2021-03-21 | Stop reason: HOSPADM

## 2021-03-21 RX ORDER — MIDAZOLAM HYDROCHLORIDE 1 MG/ML
INJECTION, SOLUTION INTRAMUSCULAR; INTRAVENOUS AS NEEDED
Status: DISCONTINUED | OUTPATIENT
Start: 2021-03-21 | End: 2021-03-21 | Stop reason: HOSPADM

## 2021-03-21 RX ORDER — GLYCOPYRROLATE 0.2 MG/ML
0.2 INJECTION INTRAMUSCULAR; INTRAVENOUS
Status: DISCONTINUED | OUTPATIENT
Start: 2021-03-21 | End: 2021-03-21 | Stop reason: HOSPADM

## 2021-03-21 RX ORDER — MIDAZOLAM HYDROCHLORIDE 1 MG/ML
0.5 INJECTION, SOLUTION INTRAMUSCULAR; INTRAVENOUS
Status: DISCONTINUED | OUTPATIENT
Start: 2021-03-21 | End: 2021-03-21 | Stop reason: HOSPADM

## 2021-03-21 RX ORDER — SODIUM CHLORIDE, SODIUM LACTATE, POTASSIUM CHLORIDE, CALCIUM CHLORIDE 600; 310; 30; 20 MG/100ML; MG/100ML; MG/100ML; MG/100ML
1000 INJECTION, SOLUTION INTRAVENOUS CONTINUOUS
Status: DISCONTINUED | OUTPATIENT
Start: 2021-03-21 | End: 2021-03-21 | Stop reason: HOSPADM

## 2021-03-21 RX ORDER — ROCURONIUM BROMIDE 10 MG/ML
INJECTION, SOLUTION INTRAVENOUS AS NEEDED
Status: DISCONTINUED | OUTPATIENT
Start: 2021-03-21 | End: 2021-03-21 | Stop reason: HOSPADM

## 2021-03-21 RX ORDER — SODIUM CHLORIDE 9 MG/ML
25 INJECTION, SOLUTION INTRAVENOUS CONTINUOUS
Status: DISPENSED | OUTPATIENT
Start: 2021-03-21 | End: 2021-03-22

## 2021-03-21 RX ORDER — SODIUM CHLORIDE 9 MG/ML
125 INJECTION, SOLUTION INTRAVENOUS CONTINUOUS
Status: DISPENSED | OUTPATIENT
Start: 2021-03-21 | End: 2021-03-22

## 2021-03-21 RX ORDER — LIDOCAINE HYDROCHLORIDE 10 MG/ML
0.1 INJECTION, SOLUTION EPIDURAL; INFILTRATION; INTRACAUDAL; PERINEURAL AS NEEDED
Status: DISCONTINUED | OUTPATIENT
Start: 2021-03-21 | End: 2021-03-21 | Stop reason: HOSPADM

## 2021-03-21 RX ORDER — ROPIVACAINE HYDROCHLORIDE 5 MG/ML
INJECTION, SOLUTION EPIDURAL; INFILTRATION; PERINEURAL
Status: COMPLETED | OUTPATIENT
Start: 2021-03-21 | End: 2021-03-21

## 2021-03-21 RX ORDER — SODIUM CHLORIDE 0.9 % (FLUSH) 0.9 %
5-40 SYRINGE (ML) INJECTION AS NEEDED
Status: DISCONTINUED | OUTPATIENT
Start: 2021-03-21 | End: 2021-03-22 | Stop reason: HOSPADM

## 2021-03-21 RX ORDER — HYDROMORPHONE HYDROCHLORIDE 1 MG/ML
0.2 INJECTION, SOLUTION INTRAMUSCULAR; INTRAVENOUS; SUBCUTANEOUS
Status: DISCONTINUED | OUTPATIENT
Start: 2021-03-21 | End: 2021-03-21 | Stop reason: HOSPADM

## 2021-03-21 RX ORDER — HYDROXYZINE HYDROCHLORIDE 10 MG/1
10 TABLET, FILM COATED ORAL
Status: DISCONTINUED | OUTPATIENT
Start: 2021-03-21 | End: 2021-03-22 | Stop reason: HOSPADM

## 2021-03-21 RX ORDER — AMOXICILLIN 250 MG
1 CAPSULE ORAL 2 TIMES DAILY
Status: DISCONTINUED | OUTPATIENT
Start: 2021-03-21 | End: 2021-03-22 | Stop reason: HOSPADM

## 2021-03-21 RX ORDER — ALBUTEROL SULFATE 0.83 MG/ML
2.5 SOLUTION RESPIRATORY (INHALATION) AS NEEDED
Status: DISCONTINUED | OUTPATIENT
Start: 2021-03-21 | End: 2021-03-21 | Stop reason: HOSPADM

## 2021-03-21 RX ORDER — HYDROCODONE BITARTRATE AND ACETAMINOPHEN 5; 325 MG/1; MG/1
1 TABLET ORAL AS NEEDED
Status: DISCONTINUED | OUTPATIENT
Start: 2021-03-21 | End: 2021-03-21 | Stop reason: HOSPADM

## 2021-03-21 RX ORDER — SODIUM CHLORIDE 9 MG/ML
25 INJECTION, SOLUTION INTRAVENOUS CONTINUOUS
Status: DISCONTINUED | OUTPATIENT
Start: 2021-03-21 | End: 2021-03-21 | Stop reason: HOSPADM

## 2021-03-21 RX ORDER — MIDAZOLAM HYDROCHLORIDE 1 MG/ML
INJECTION, SOLUTION INTRAMUSCULAR; INTRAVENOUS
Status: COMPLETED
Start: 2021-03-21 | End: 2021-03-21

## 2021-03-21 RX ORDER — ACETAMINOPHEN 325 MG/1
650 TABLET ORAL EVERY 6 HOURS
Status: DISCONTINUED | OUTPATIENT
Start: 2021-03-21 | End: 2021-03-22 | Stop reason: HOSPADM

## 2021-03-21 RX ORDER — DIPHENHYDRAMINE HYDROCHLORIDE 50 MG/ML
12.5 INJECTION, SOLUTION INTRAMUSCULAR; INTRAVENOUS AS NEEDED
Status: DISCONTINUED | OUTPATIENT
Start: 2021-03-21 | End: 2021-03-21 | Stop reason: HOSPADM

## 2021-03-21 RX ORDER — LIDOCAINE HYDROCHLORIDE 20 MG/ML
INJECTION, SOLUTION EPIDURAL; INFILTRATION; INTRACAUDAL; PERINEURAL AS NEEDED
Status: DISCONTINUED | OUTPATIENT
Start: 2021-03-21 | End: 2021-03-21 | Stop reason: HOSPADM

## 2021-03-21 RX ORDER — CITALOPRAM 20 MG/1
30 TABLET, FILM COATED ORAL
Status: DISCONTINUED | OUTPATIENT
Start: 2021-03-21 | End: 2021-03-22 | Stop reason: HOSPADM

## 2021-03-21 RX ORDER — NEOSTIGMINE METHYLSULFATE 1 MG/ML
INJECTION INTRAVENOUS AS NEEDED
Status: DISCONTINUED | OUTPATIENT
Start: 2021-03-21 | End: 2021-03-21 | Stop reason: HOSPADM

## 2021-03-21 RX ORDER — FENTANYL CITRATE 50 UG/ML
INJECTION, SOLUTION INTRAMUSCULAR; INTRAVENOUS
Status: COMPLETED
Start: 2021-03-21 | End: 2021-03-21

## 2021-03-21 RX ORDER — OXYCODONE HYDROCHLORIDE 5 MG/1
5 TABLET ORAL
Status: DISCONTINUED | OUTPATIENT
Start: 2021-03-21 | End: 2021-03-22 | Stop reason: HOSPADM

## 2021-03-21 RX ORDER — ACETAMINOPHEN 325 MG/1
650 TABLET ORAL ONCE
Status: ACTIVE | OUTPATIENT
Start: 2021-03-21 | End: 2021-03-21

## 2021-03-21 RX ORDER — HYDROMORPHONE HYDROCHLORIDE 1 MG/ML
INJECTION, SOLUTION INTRAMUSCULAR; INTRAVENOUS; SUBCUTANEOUS AS NEEDED
Status: DISCONTINUED | OUTPATIENT
Start: 2021-03-21 | End: 2021-03-21 | Stop reason: HOSPADM

## 2021-03-21 RX ORDER — GUAIFENESIN 100 MG/5ML
81 LIQUID (ML) ORAL DAILY
Status: DISCONTINUED | OUTPATIENT
Start: 2021-03-22 | End: 2021-03-22 | Stop reason: HOSPADM

## 2021-03-21 RX ORDER — ONDANSETRON 2 MG/ML
INJECTION INTRAMUSCULAR; INTRAVENOUS AS NEEDED
Status: DISCONTINUED | OUTPATIENT
Start: 2021-03-21 | End: 2021-03-21 | Stop reason: HOSPADM

## 2021-03-21 RX ORDER — ROPIVACAINE HYDROCHLORIDE 5 MG/ML
30 INJECTION, SOLUTION EPIDURAL; INFILTRATION; PERINEURAL AS NEEDED
Status: DISCONTINUED | OUTPATIENT
Start: 2021-03-21 | End: 2021-03-21 | Stop reason: HOSPADM

## 2021-03-21 RX ORDER — MORPHINE SULFATE 2 MG/ML
2 INJECTION, SOLUTION INTRAMUSCULAR; INTRAVENOUS
Status: DISCONTINUED | OUTPATIENT
Start: 2021-03-21 | End: 2021-03-21 | Stop reason: HOSPADM

## 2021-03-21 RX ORDER — FENTANYL CITRATE 50 UG/ML
50 INJECTION, SOLUTION INTRAMUSCULAR; INTRAVENOUS AS NEEDED
Status: DISCONTINUED | OUTPATIENT
Start: 2021-03-21 | End: 2021-03-21 | Stop reason: HOSPADM

## 2021-03-21 RX ORDER — FENTANYL CITRATE 50 UG/ML
INJECTION, SOLUTION INTRAMUSCULAR; INTRAVENOUS AS NEEDED
Status: DISCONTINUED | OUTPATIENT
Start: 2021-03-21 | End: 2021-03-21 | Stop reason: HOSPADM

## 2021-03-21 RX ORDER — ONDANSETRON 2 MG/ML
4 INJECTION INTRAMUSCULAR; INTRAVENOUS AS NEEDED
Status: DISCONTINUED | OUTPATIENT
Start: 2021-03-21 | End: 2021-03-21 | Stop reason: HOSPADM

## 2021-03-21 RX ORDER — LATANOPROST 50 UG/ML
1 SOLUTION/ DROPS OPHTHALMIC
Status: DISCONTINUED | OUTPATIENT
Start: 2021-03-21 | End: 2021-03-21 | Stop reason: SDUPTHER

## 2021-03-21 RX ORDER — POLYETHYLENE GLYCOL 3350 17 G/17G
17 POWDER, FOR SOLUTION ORAL DAILY
Status: DISCONTINUED | OUTPATIENT
Start: 2021-03-22 | End: 2021-03-22 | Stop reason: HOSPADM

## 2021-03-21 RX ORDER — SODIUM CHLORIDE 9 MG/ML
INJECTION, SOLUTION INTRAVENOUS
Status: DISCONTINUED | OUTPATIENT
Start: 2021-03-21 | End: 2021-03-21 | Stop reason: HOSPADM

## 2021-03-21 RX ORDER — PHENYLEPHRINE HCL IN 0.9% NACL 0.4MG/10ML
SYRINGE (ML) INTRAVENOUS AS NEEDED
Status: DISCONTINUED | OUTPATIENT
Start: 2021-03-21 | End: 2021-03-21 | Stop reason: HOSPADM

## 2021-03-21 RX ORDER — TRAMADOL HYDROCHLORIDE 50 MG/1
50 TABLET ORAL
Status: DISCONTINUED | OUTPATIENT
Start: 2021-03-21 | End: 2021-03-22 | Stop reason: HOSPADM

## 2021-03-21 RX ORDER — SUCCINYLCHOLINE CHLORIDE 20 MG/ML
INJECTION INTRAMUSCULAR; INTRAVENOUS AS NEEDED
Status: DISCONTINUED | OUTPATIENT
Start: 2021-03-21 | End: 2021-03-21 | Stop reason: HOSPADM

## 2021-03-21 RX ORDER — PROPOFOL 10 MG/ML
INJECTION, EMULSION INTRAVENOUS AS NEEDED
Status: DISCONTINUED | OUTPATIENT
Start: 2021-03-21 | End: 2021-03-21 | Stop reason: HOSPADM

## 2021-03-21 RX ORDER — FENTANYL CITRATE 50 UG/ML
25 INJECTION, SOLUTION INTRAMUSCULAR; INTRAVENOUS
Status: DISCONTINUED | OUTPATIENT
Start: 2021-03-21 | End: 2021-03-21 | Stop reason: HOSPADM

## 2021-03-21 RX ADMIN — Medication 80 MCG: at 11:23

## 2021-03-21 RX ADMIN — LIDOCAINE HYDROCHLORIDE 20 MG: 20 INJECTION, SOLUTION EPIDURAL; INFILTRATION; INTRACAUDAL; PERINEURAL at 10:36

## 2021-03-21 RX ADMIN — DORZOLAMIDE HYDROCHLORIDE 2 DROP: 20 SOLUTION/ DROPS OPHTHALMIC at 17:45

## 2021-03-21 RX ADMIN — DORZOLAMIDE HYDROCHLORIDE 2 DROP: 20 SOLUTION/ DROPS OPHTHALMIC at 21:57

## 2021-03-21 RX ADMIN — HYDROMORPHONE HYDROCHLORIDE 0.25 MG: 1 INJECTION, SOLUTION INTRAMUSCULAR; INTRAVENOUS; SUBCUTANEOUS at 12:15

## 2021-03-21 RX ADMIN — Medication 10 ML: at 06:15

## 2021-03-21 RX ADMIN — ACETAMINOPHEN 650 MG: 325 TABLET ORAL at 17:43

## 2021-03-21 RX ADMIN — PROPOFOL 110 MG: 10 INJECTION, EMULSION INTRAVENOUS at 10:36

## 2021-03-21 RX ADMIN — METOPROLOL SUCCINATE 12.5 MG: 25 TABLET, EXTENDED RELEASE ORAL at 21:56

## 2021-03-21 RX ADMIN — Medication 120 MCG: at 11:02

## 2021-03-21 RX ADMIN — ROCURONIUM BROMIDE 25 MG: 10 SOLUTION INTRAVENOUS at 10:41

## 2021-03-21 RX ADMIN — TRAMADOL HYDROCHLORIDE 50 MG: 50 TABLET, FILM COATED ORAL at 04:23

## 2021-03-21 RX ADMIN — CEFAZOLIN SODIUM 2 G: 1 INJECTION, POWDER, FOR SOLUTION INTRAMUSCULAR; INTRAVENOUS at 19:18

## 2021-03-21 RX ADMIN — OXYCODONE 5 MG: 5 TABLET ORAL at 22:00

## 2021-03-21 RX ADMIN — WATER 2 G: 1 INJECTION INTRAMUSCULAR; INTRAVENOUS; SUBCUTANEOUS at 10:40

## 2021-03-21 RX ADMIN — DOCUSATE SODIUM 50 MG AND SENNOSIDES 8.6 MG 1 TABLET: 8.6; 5 TABLET, FILM COATED ORAL at 17:43

## 2021-03-21 RX ADMIN — LEVOTHYROXINE SODIUM 50 MCG: 0.05 TABLET ORAL at 06:11

## 2021-03-21 RX ADMIN — Medication 20 ML: at 22:01

## 2021-03-21 RX ADMIN — ROCURONIUM BROMIDE 5 MG: 10 SOLUTION INTRAVENOUS at 10:36

## 2021-03-21 RX ADMIN — FENTANYL CITRATE 50 MCG: 50 INJECTION, SOLUTION INTRAMUSCULAR; INTRAVENOUS at 10:24

## 2021-03-21 RX ADMIN — DORZOLAMIDE HYDROCHLORIDE 2 DROP: 20 SOLUTION/ DROPS OPHTHALMIC at 08:58

## 2021-03-21 RX ADMIN — ONDANSETRON HYDROCHLORIDE 4 MG: 2 INJECTION, SOLUTION INTRAMUSCULAR; INTRAVENOUS at 11:52

## 2021-03-21 RX ADMIN — NEOSTIGMINE METHYLSULFATE 3 MG: 1 INJECTION, SOLUTION INTRAVENOUS at 12:17

## 2021-03-21 RX ADMIN — ACETAMINOPHEN 650 MG: 325 TABLET ORAL at 09:54

## 2021-03-21 RX ADMIN — PANTOPRAZOLE SODIUM 40 MG: 40 TABLET, DELAYED RELEASE ORAL at 06:10

## 2021-03-21 RX ADMIN — LATANOPROST 1 DROP: 50 SOLUTION OPHTHALMIC at 17:45

## 2021-03-21 RX ADMIN — MIDAZOLAM 2 MG: 1 INJECTION INTRAMUSCULAR; INTRAVENOUS at 10:24

## 2021-03-21 RX ADMIN — SODIUM CHLORIDE: 900 INJECTION, SOLUTION INTRAVENOUS at 12:28

## 2021-03-21 RX ADMIN — SODIUM CHLORIDE, POTASSIUM CHLORIDE, SODIUM LACTATE AND CALCIUM CHLORIDE: 600; 310; 30; 20 INJECTION, SOLUTION INTRAVENOUS at 10:24

## 2021-03-21 RX ADMIN — GLYCOPYRROLATE 0.4 MG: 0.2 INJECTION, SOLUTION INTRAMUSCULAR; INTRAVENOUS at 12:17

## 2021-03-21 RX ADMIN — FENTANYL CITRATE 50 MCG: 50 INJECTION, SOLUTION INTRAMUSCULAR; INTRAVENOUS at 10:36

## 2021-03-21 RX ADMIN — SUCCINYLCHOLINE CHLORIDE 110 MG: 20 INJECTION, SOLUTION INTRAMUSCULAR; INTRAVENOUS at 10:36

## 2021-03-21 RX ADMIN — Medication 120 MCG: at 10:57

## 2021-03-21 RX ADMIN — CITALOPRAM HYDROBROMIDE 30 MG: 20 TABLET ORAL at 21:56

## 2021-03-21 RX ADMIN — Medication 80 MCG: at 11:45

## 2021-03-21 RX ADMIN — ROPIVACAINE HYDROCHLORIDE 30 ML: 5 INJECTION, SOLUTION EPIDURAL; INFILTRATION; PERINEURAL at 10:08

## 2021-03-21 NOTE — ANESTHESIA POSTPROCEDURE EVALUATION
Post-Anesthesia Evaluation and Assessment    Patient: Leticia Rico MRN: [id number]  SSN: [id number]    YOB: 1940  Age: 79 y.o. Sex: female      I have evaluated the patient and they are stable and ready for discharge from the PACU. Cardiovascular Function/Vital Signs  Visit Vitals  BP (!) 97/51   Pulse 75   Temp 36.6 °C (97.8 °F)   Resp 17   Ht 5' 5\" (1.651 m)   Wt 83.9 kg (185 lb)   SpO2 92%   BMI 30.79 kg/m²       Patient is status post General anesthesia for Procedure(s):  ELBOW OPEN REDUCTION INTERNAL FIXATION. Nausea/Vomiting: None    Postoperative hydration reviewed and adequate. Pain:  Pain Scale 1: Numeric (0 - 10) (03/21/21 1320)  Pain Intensity 1: 0 (03/21/21 1320)   Managed    Neurological Status:   Neuro (WDL): Within Defined Limits (03/21/21 1320)  Neuro  Neurologic State: Alert (03/21/21 1320)  Orientation Level: Oriented X4 (03/21/21 0849)  Cognition: Appropriate decision making; Appropriate for age attention/concentration; Appropriate safety awareness (03/21/21 0849)  Speech: Clear (03/21/21 0849)  Assessment L Pupil: Round (03/20/21 1609)  Size L Pupil (mm): 4 (03/20/21 1609)  Assessment R Pupil: (Pt states that she has glaucoma in this eye.) (03/20/21 1609)  LUE Motor Response: Numbness(block) (03/21/21 1320)  LLE Motor Response: Purposeful (03/21/21 1320)  RUE Motor Response: Purposeful (03/21/21 1320)  RLE Motor Response: Purposeful (03/21/21 1320)   At baseline    Mental Status, Level of Consciousness: Alert and  oriented to person, place, and time    Pulmonary Status:   O2 Device: Room air (03/21/21 1320)   Adequate oxygenation and airway patent    Complications related to anesthesia: None    Post-anesthesia assessment completed. No concerns    Signed By: Chantel Esteban MD     March 21, 2021              Procedure(s):  ELBOW OPEN REDUCTION INTERNAL FIXATION.     general    <BSHSIANPOST>    INITIAL Post-op Vital signs:   Vitals Value Taken Time   BP 94/48 03/21/21 1332 Temp 36.6 °C (97.8 °F) 03/21/21 1257   Pulse 69 03/21/21 1339   Resp 17 03/21/21 1339   SpO2 100 % 03/21/21 1339   Vitals shown include unvalidated device data.

## 2021-03-21 NOTE — PROGRESS NOTES
2230 Patient complaining of 4/10 pain. Asked Dariana Nelson NP if there was anything we could give her besides the ordered tylenol if her pain increases. Dariana Nelson ordered tramadol. 2315 Patient complaining of 8/10 pain. Dariana Nelson NP aware and stated if pt's pain is still bad at midnight to let him know. Ice put on patient's arm. Will continue to monitor. 0015 Patient is sleeping. 0800 Bedside and Verbal shift change report given to Gabriella Jauregui RN (oncoming nurse) by Si Moritz, RN (offgoing nurse). Report included the following information SBAR and ED Summary.

## 2021-03-21 NOTE — PROGRESS NOTES
TRANSFER - IN REPORT:    Verbal report received from Owen(name) on Thiago Valdovinos  being received from ED(unit) for routine progression of care      Report consisted of patients Situation, Background, Assessment and   Recommendations(SBAR). Information from the following report(s) ED Summary was reviewed with the receiving nurse. Opportunity for questions and clarification was provided. Assessment completed upon patients arrival to unit and care assumed. Primary Nurse Brice Ramos and Luz Marina Christianson RN performed a dual skin assessment on this patient Impairment noted- see wound doc flow sheet  Tan score is 21    Patient has scattered bruising/ scrapes on face along with stitches. Patient's L arm is in a sling and cannot assess skin.

## 2021-03-21 NOTE — CONSULTS
Ortho Consult      Patient:  Mag Coats  YOB: 1940    MRN: [id number]     Acct: [de-identified]    PCP: Lindsay Sylvester MD    Date of Admission: 3/20/2021    Date of Service: Pt seen/examined on 3/21/2021     Chief Complaint: Left elbow pain      History Of Present Illness: 79 y.o. female who presents with left elbow injury. This was a result of a mechanical fall from standing. She was walking in the garden and tripped over a curb causing her to fall. The patient had immediate pain in the left elbow. The patient describes the pain as sharp and worse with motion, improved with rest.  The patient had no other extremity injuries, no neck or back injury, and no loss of consciousness. She did however strike her head and sustain a laceration to her left side of her face which was repaired in the ER. She was recently diagnosed with A fib and was due to start taking eliquis but reportedly has not started taking it yet. Antiplatelets/Anticoagulation includes: none.     Past Medical History:      Past Medical History:   Diagnosis Date    Asthma     Atrial fibrillation ([address] 2021    Colon polyps     tubular adenoma 2017    Colon polyps     Fracture 2017    Right humerus (pt fell)    GERD (gastroesophageal reflux disease)     Glaucoma     Humeral fracture     2017    Hyperlipidemia     Macular edema     Major depression in partial remission ([address]     Mitral valve prolapse     Osteopenia     TIA (transient ischemic attack)          Past Surgical History:      Past Surgical History:   Procedure Laterality Date    COLONOSCOPY N/A 2020    COLONOS[facility] performed by [facility]an Herman [facility] at [facility] ENDOSCOPY    HX BREAST BIOPSY Left     benign surgical bx done yrs ago    HX CATARACT REMOVAL      HX  SECTION      HX CHOLECYSTECTOMY      HX COLONOSCOPY      ,     HX TONSILLECTOMY      HX WISDOM TEETH EXTRACTION               Home Medications:   Prior to Admission medications    Medication Sig Start Date End Date Taking? Authorizing Provider   apixaban (ELIQUIS) 5 mg tablet Take 1 Tab by mouth two (2) times a day. 3/19/21  Yes Demario Tavarez MD   metoprolol succinate (Toprol XL) 25 mg XL tablet Take 0.5 Tabs by mouth nightly. Indications: high blood pressure, multifocal atrial tachycardia, an unusually high heart rate at rest, paroxysmal supraventricular tachycardia 3/19/21  Yes Demario Tavarez MD   levothyroxine (SYNTHROID) 50 mcg tablet TAKE ONE TABLET BY MOUTH EVERY MORNING BEFORE BREAKFAST 3/11/21  Yes Josh Mcghee MD   dorzolamide (TRUSOPT) 2 % ophthalmic solution Administer 2 Drops to both eyes three (3) times daily. Yes Provider, Historical   citalopram (CELEXA) 20 mg tablet TAKE 1 AND 1/2 TABLETS BY MOUTH EVERY DAY. 12/21/20  Yes Josh Mcghee MD   omeprazole (PRILOSEC) 20 mg capsule TAKE 1 CAPSULE BY MOUTH EVERY DAY. 11/2/20  Yes Iwona Fong MD   cholecalciferol, VITAMIN D3, (VITAMIN D3) 5,000 unit tab tablet Take  by mouth daily. Yes Provider, Historical   aspirin 81 mg chewable tablet Take 81 mg by mouth daily. Yes Provider, Historical   latanoprost (XALATAN) 0.005 % ophthalmic solution Administer 1 Drop to both eyes nightly. Yes Provider, Historical   olopatadine (Pataday) 0.2 % drop ophthalmic solution Administer 1 Drop to both eyes daily.     Provider, Historical          Current Hospital Medications:      Current Facility-Administered Medications:     ceFAZolin (ANCEF) 2 g in sterile water (preservative free) 20 mL IV syringe, 2 g, IntraVENous, ONCE, Kelsey Barboza PA    sodium chloride (NS) flush 5-40 mL, 5-40 mL, IntraVENous, Q8H, Keegan Garcia MD, 10 mL at 03/21/21 0615    sodium chloride (NS) flush 5-40 mL, 5-40 mL, IntraVENous, PRN, Keegan Garcia MD    acetaminophen (TYLENOL) tablet 650 mg, 650 mg, Oral, Q6H PRN **OR** acetaminophen (TYLENOL) suppository 650 mg, 650 mg, Rectal, Q6H PRN, Jacquiline Antis, MD    polyethylene glycol (MIRALAX) packet 17 g, 17 g, Oral, DAILY PRN, Janine Solano MD    promethazine (PHENERGAN) tablet 12.5 mg, 12.5 mg, Oral, Q6H PRN **OR** ondansetron (ZOFRAN) injection 4 mg, 4 mg, IntraVENous, Q6H PRN, Janine Solano MD    pantoprazole (PROTONIX) tablet 40 mg, 40 mg, Oral, ACB, Janine Solano MD, 40 mg at 03/21/21 0610    latanoprost (XALATAN) 0.005 % ophthalmic solution 1 Drop, 1 Drop, Both Eyes, QPM, Janine Solano MD    cholecalciferol (VITAMIN D3) (1000 Units /25 mcg) tablet 5,000 Units, 5,000 Units, Oral, DAILY, Janine Solano MD    dorzolamide (TRUSOPT) 2 % ophthalmic solution 2 Drop, 2 Drop, Both Eyes, TID, Janine Solano MD, 2 Drop at 03/20/21 2230    metoprolol succinate (TOPROL-XL) XL tablet 12.5 mg, 12.5 mg, Oral, QHS, Janine Solano MD, 12.5 mg at 03/20/21 2230    levothyroxine (SYNTHROID) tablet 50 mcg, 50 mcg, Oral, 6am, Janine Solano MD, 50 mcg at 03/21/21 2274    ketotifen (ZADITOR) 0.025 % (0.035 %) ophthalmic solution 1 Drop, 1 Drop, Both Eyes, BID, Janine Solano MD    traMADoL (ULTRAM) tablet 50 mg, 50 mg, Oral, Q6H PRN, Jacob Lerma NP, 50 mg at 03/21/21 0423    citalopram (CELEXA) tablet 30 mg, 30 mg, Oral, DAILY, Jacob Lerma NP       Allergies:  Patient has no known allergies.     Social History:      Social History     Socioeconomic History    Marital status:      Spouse name: Not on file    Number of children: Not on file    Years of education: Not on file    Highest education level: Not on file   Occupational History    Occupation: retired    Social Needs    Financial resource strain: Not on file    Food insecurity     Worry: Not on file     Inability: Not on file   [employer] needs     Medical: Not on file     Non-medical: Not on file   Tobacco Use    Smoking status: Former Smoker     Packs/day: 0.25     Years: 10.00     Pack years: 2.50     Types: Cigarettes    Smokeless tobacco: Never Used   Substance and Sexual Activity    Alcohol use: Yes     Alcohol/week: 7.0 standard drinks     Types: 7 Glasses of wine per week    Drug use: Yes     Types: Prescription, OTC    Sexual activity: Not on file   Lifestyle    Physical activity     Days per week: Not on file     Minutes per session: Not on file    Stress: Not on file   Relationships    Social connections     Talks on phone: Not on file     Gets together: Not on file     Attends Denominational service: Not on file     Active member of club or organization: Not on file     Attends meetings of clubs or organizations: Not on file     Relationship status: Not on file    Intimate partner violence     Fear of current or ex partner: Not on file     Emotionally abused: Not on file     Physically abused: Not on file     Forced sexual activity: Not on file   Other Topics Concern    Not on file   Social History Narrative    Not on file       Family History:        Family History   Problem Relation Age of Onset    Hypertension Mother 80    Coronary Artery Disease Mother     Alcohol abuse Father     Stroke Father     Arrhythmia Brother          a fib    Hypertension Brother     Cancer Maternal Grandmother          Further Family History is noncontributory to this injury.       REVIEW OF SYSTEMS:      Review of Systems - General ROS: negative for - chills, fatigue, fever, malaise or night sweats  Psychological ROS: negative  Ophthalmic ROS: negative   ENT ROS: negative for - headaches or sore throat  Hematological and Lymphatic ROS: negative for - bleeding problems or blood clots  Respiratory ROS: no cough, shortness of breath, or wheezing  Cardiovascular ROS: no chest pain or dyspnea on exertion  Gastrointestinal ROS: negative  Musculoskeletal ROS: See HPI  Neurological ROS: negative for - bowel and bladder control changes, gait disturbance or numbness/tingling    All other systems reviewed and are negative    PHYSICAL EXAM:    Visit Vitals  BP (!) 150/84 (BP 1 Location: Right upper arm, BP Patient Position: At rest)   Pulse 93   Temp 97.8 °F (36.6 °C)   Resp 18   Ht 5' 5\" (1.651 m)   Wt 83.9 kg (185 lb)   LMP 09/06/1992   SpO2 93%   BMI 30.79 kg/m²       GENERAL APPEARANCE: Awake and alert. No acute distress, except appropriate to injury. +Facial swelling and laceration with sutures in place. MOOD AND AFFECT: Calm appropriate to situation  GAIT AND STATION: Patient is in bed and unable to ambulate secondary to known injury. REFLEXES: No clonus or Babinski. COORDINATION and BALANCE: Patient is grossly coordinated and unable to ambulate secondary to known injury. LYMPH: No cervical lymphadenopathy noted    Right Upper Extremity:    No obvious pain or deformity to inspection with normal joint range of motion, stability, and muscle strength except noted below in bold. Sensation intact to all dermatomes. Radial pulse palpable. Brisk cap refill in all digits. No Lymphedema. Skin intact except where noted below in bold. Painless passive range of motion at shoulder/elbow/wrist, no tenderness to palpation over clavical/shoulder/humerus/elbow/forearm/distal radius/hand. No bony TTP or joint pain with ROM. Left Upper Extremity:    No obvious pain or deformity to inspection with normal joint range of motion, stability, and muscle strength except noted below in bold. Sensation intact to all dermatomes. Radial pulse palpable. Brisk cap refill in all digits. No Lymphedema. Skin intact except where noted below in bold. Painless passive range of motion at shoulder/elbow/wrist, no tenderness to palpation over clavical/shoulder/humerus/elbow/forearm/distal radius/hand. Splint c/d/i, SILT M/U/R. +AIN/PIN/U motor. BCR in fingertips. Right Lower Extremity:    No obvious pain or deformity to inspection with normal joint range of motion, stability, and muscle strength except noted below in bold. Sensation intact to all dermatomes. Cap refill brisk in all toes. No Lymphedema. Skin intact except where noted below in bold. No tenderness to palpation over knee/tibia/medial mal/lateral mal/calcaneus/midfoot/forefoot. Sensation intact to light touch in the superficial peroneal, deep peroneal, tibial, sural, saphenous nerve distributions. Motor function of tibialis anterior, extensor hallucis longus, and gactrocsoleus complex intact. Dorsalis pedis and posterior tibialis pulses are palpable. Negative log roll, no bony TTP or joint pain with ROM. Left Lower Extremity:    No obvious pain or deformity to inspection with normal joint range of motion, stability, and muscle strength except noted below in bold. Sensation intact to all dermatomes. Cap refill brisk in all toes. No Lymphedema. Skin intact except where noted below in bold. No tenderness to palpation over knee/tibia/medial mal/lateral mal/calcaneus/midfoot/forefoot. Sensation intact to light touch in the superficial peroneal, deep peroneal, tibial, sural, saphenous nerve distributions. Motor function of tibialis anterior, extensor hallucis longus, and gactrocsoleus complex intact. Dorsalis pedis and posterior tibialis pulses are palpable. Negative log roll, no bony TTP or joint pain with ROM. Labs:     Recent Labs     03/21/21  0443 03/20/21  1618   WBC 12.1* 8.5   HGB 12.9 13.0   HCT 40.2 40.5    242     Recent Labs     03/21/21  0443 03/20/21  1618    139   K 4.0 4.0    109*   CO2 23 25   BUN 23* 23*     No results for input(s): INR, INREXT in the last 72 hours. No results for input(s): SEDRATE, CRP in the last 72 hours. No results for input(s): HCG in the last 72 hours. The above labs were reviewed by me.         Radiology:     XR:   Left elbow: displaced proximal ulna fracture with posterior radial head dislocation consistent with Monteggia fracture  Left knee: no fracture, +chondrocalcinosis  Left shoulder: no fracture  Left wrist: no fracture, + basilar thumb arthritis    Radiology report reviewed.       ASSESSMENT:  79 y.o. female with left elbow Monteggia fracture    PLAN:    -Plan for ORIF today  -NPO  -Consent on chart  -Pain control  -Keep splint c/d/i  -Ice/Elevate  -NWB LUE  -Please hold DVT prophylaxis in anticipation of Munira Espinosa MD

## 2021-03-21 NOTE — PERIOP NOTES
Received preop report in SBAR format from [employer]. Patient will be coming from CT scan to pacu preop.

## 2021-03-21 NOTE — PROGRESS NOTES
Bedside and Verbal shift change report given to Robin Jensen RN (oncoming nurse) by Carmen Flores RN (offgoing nurse). Report included the following information SBAR, ED Summary, Procedure Summary, MAR and Recent Results.

## 2021-03-21 NOTE — ED NOTES
TRANSFER - OUT REPORT:    Verbal report given to RN Owen(name) on Ulises Geiger  being transferred to (unit) for routine progression of care       Report consisted of patients Situation, Background, Assessment and   Recommendations(SBAR). Information from the following report(s) SBAR, Kardex and ED Summary was reviewed with the receiving nurse. Lines:   Peripheral IV 03/20/21 (Active)   Site Assessment Clean, dry, & intact 03/20/21 1916   Phlebitis Assessment 0 03/20/21 1916   Infiltration Assessment 0 03/20/21 1916   Dressing Status Clean, dry, & intact 03/20/21 1916        Opportunity for questions and clarification was provided.       Patient transported with:   [employer]

## 2021-03-21 NOTE — ANESTHESIA PREPROCEDURE EVALUATION
Relevant Problems   No relevant active problems       Anesthetic History   No history of anesthetic complications            Review of Systems / Medical History  Patient summary reviewed, nursing notes reviewed and pertinent labs reviewed    Pulmonary            Asthma        Neuro/Psych         TIA     Cardiovascular            Dysrhythmias : atrial fibrillation and atrial flutter      Exercise tolerance: >4 METS     GI/Hepatic/Renal     GERD           Endo/Other             Other Findings              Physical Exam    Airway  Mallampati: II  TM Distance: > 6 cm  Neck ROM: normal range of motion   Mouth opening: Normal     Cardiovascular  Regular rate and rhythm,  S1 and S2 normal,  no murmur, click, rub, or gallop             Dental  No notable dental hx       Pulmonary  Breath sounds clear to auscultation               Abdominal  GI exam deferred       Other Findings            Anesthetic Plan    ASA: 3  Anesthesia type: general      Post-op pain plan if not by surgeon: peripheral nerve block single    Induction: Intravenous  Anesthetic plan and risks discussed with: Patient

## 2021-03-21 NOTE — CONSULTS
Geriatric Fracture Consult  Patient: Art Lawler  YOB: 1940   MRN: [id number]      Consult Date: 3/20/2021     Chief Complaint: Left elbow pain  ED Presentation Time: < 8 hours  Mechanism of Injury: Fall from standing  Ambulatory Status: Independent  Past Medical History:   Past Medical History:   Diagnosis Date    Asthma     At[facility] ([email or web address] 2021    Colon polyps     tubular adenoma 2017    Colon polyps     Fracture 2017    Right humerus (pt fell)    GERD (gastroesophageal reflux disease)     Glaucoma     Humeral fracture     2017    Hyperlipidemia     Macular edema     Major depression in partial remission [address]     Mitral valve prolapse     Osteopenia     TIA (transient ischemic attack)        Allergies: No Known Allergies   Past Surgical History:   Past Surgical History:   Procedure Laterality Date    COLONOSCOPY N/A 2020    COL[facility] Oliver [facility]otson [facility] at [facility] ENDOSCOPY    HX BREAST BIOPSY Left     benign surgical bx done yrs ago    HX CATARACT REMOVAL      HX  SECTION      HX CHOLECYSTECTOMY      HX COLONOSCOPY      ,     HX TONSILLECTOMY      HX WISDOM TEETH EXTRACTION        Social History:   Social History     Socioeconomic History    Marital status:      Spouse name: Not on file    Number of children: Not on file    Years of education: Not on file    Highest education level: Not on file   Occupational History    Occupation: retired    Social Needs    Financial resource strain: Not on file    Food insecurity     Worry: Not on file     Inability: Not on file   [employer] needs     Medical: Not on file     Non-medical: Not on file   Tobacco Use    Smoking status: Former Smoker     Packs/day: 0.25     Years: 10.00     Pack years: 2.50     Types: Cigarettes    Smokeless tobacco: Never Used   Substance and Sexual Activity    Alcohol use:  Yes     Alcohol/week: 7.0 standard drinks Types: 7 Glasses of wine per week    Drug use: Yes     Types: Prescription, OTC    Sexual activity: Not on file   Lifestyle    Physical activity     Days per week: Not on file     Minutes per session: Not on file    Stress: Not on file   Relationships    Social connections     Talks on phone: Not on file     Gets together: Not on file     Attends Orthodox service: Not on file     Active member of club or organization: Not on file     Attends meetings of clubs or organizations: Not on file     Relationship status: Not on file    Intimate partner violence     Fear of current or ex partner: Not on file     Emotionally abused: Not on file     Physically abused: Not on file     Forced sexual activity: Not on file   Other Topics Concern    Not on file   Social History Narrative    Not on file      Dwelling Status: Alone  Current Anticoagulant Medications: none, scheduled to start Eliquis this week  History of falls: YES  Prior Fractures: Radius  Labs:    Lab Results   Component Value Date/Time    HGB 13.0 03/20/2021 04:18 PM    WBC 8.5 03/20/2021 04:18 PM    Albumin 3.4 (L) 03/20/2021 04:18 PM     X-RAYS:   XR ELBOW LT MIN 3 V 3/20/2021 17:47  INDICATION: Left elbow pain after fall  COMPARISON: None. FINDINGS: Three views of the left elbow demonstrate a comminuted proximal ulnar  fracture with a fracture line extending into the olecranon. The radial head is  posteriorly dislocated. There is an elbow deformity with soft tissue swelling. A  large joint effusion is seen.   IMPRESSION  Acute fracture dislocation of the left elbow    Physical Exam:   General: 78yo female, cooperative, no distress, appears stated age  CNS: alert/oriented, normal mood  Vascular: brisk capillary refill left hand   Skin: laceration above left eye repaired prior to my arrival, splint on left arm   Extremities: left arm in splint  Neurologic: moving left hand/fingers normally as much as splint allows, no sensory deficits    Assessment: Left olecranon fracture w/radial head dislocation    Plan: Discussed diagnosis and treatment options. Recommend surgical reduction and fixation. We discussed surgery in detail and her questions were answered. Plan for left elbow open reduction internal fixation tomorrow morning. Posted with OR. NPO after midnight.  Discussed with Dr. Grey Ruiz           Signed by: DESTINEE Ryan   Today's Date: March 20, 2021

## 2021-03-21 NOTE — PROGRESS NOTES
TRANSFER - IN REPORT:    Verbal report received from [facility], [facility] (name) on Shanique Breen  being received from PACU (unit) for routine post - op      Report consisted of patients Situation, Background, Assessment and   Recommendations(SBAR). Information from the following report(s) SBAR, OR Summary, Procedure Summary and MAR was reviewed with the receiving nurse. Opportunity for questions and clarification was provided. Assessment completed upon patients arrival to unit and care assumed.

## 2021-03-21 NOTE — OP NOTES
Operative Report    Patient Name:  Steve Dozier   YOB: 1940   MRN:  [id number]   Date of Surgery: 03/21/21        Pre-operative diagnosis:  Left elbow Monteggia fracture/dislocation    Post-operative diagnosis:  Same    Procedure(s): Open reduction internal fixation of left elbow Monteggia fracture dislocation (CPT 38823)    Surgeon: Davidson Gutierrez M.D. Assistant(s):  Selene Cobian M.D. Anesthesia: General with regional block    EBL:  Minimal    IVF: Crystalloid    Medications: Two grams of Cefazolin were given. Implants: [employer] olecranon variable angle locking plate    Clinical History/Indication for Surgery  The patient is a 79y.o. year old female who was presents for operative fixation of a displaced left proximal ulna fracture with posterior radial head dislocation, consistent with a Monteggia fracture dislocation of the elbow. Typical indications for surgery were reviewed and operative fixation was recommended. Risks of fracture surgery in general were reviewed including, but not limited to, infection, non-union, need for additional procedures, painful or prominent hardware which could require removal, failure of fixation which would require revision, damage to normal structures as well as medical complications such as MI, stroke, PE, DVT, and even death. Pt was given opportunity to ask questions and consider her options. She ultimately elected to proceed with surgery. No guarantees were given or implied. Operative Narration  The patient was identified in the pre-operative holding area. The surgical site was identified and marked. Informed consent was obtained. The patient was then brought to the operating room and placed supine on the operating table. Anesthesia was administered and care of the head, neck, and airway was maintained by the anesthesia staff throughout the entire procedure. The patient was turned lateral with an axillary roll placed.   All bony prominences were identified and padded and the bean bag inflated. A tourniquet was applied to the arm of the operative extremity. The arm was prepped and draped in the usual sterile fashion. A surgical timeout was performed. Antibiotics were confirmed to have been given. After exsanguination the tourniquet was inflated to 275mmHg. A subcutaneous approach to the proximal ulna was utilized. The fracture site was exposed, cleaned, and irrigated. The fracture was reduced and held provisionally with clamps and k-wires. A 2.7mm lag screw was placed capturing the displaced coronoid fragment in anatomic position. The appropriate length [employer] olecranon plate was applied and fixed distally using cortical screws and proximally using locking screws with care taken to compress the fracture maximally. AP and lateral views showed anatomic reduction of the proximal ulna, however the radial head remained dislocated posteriorly. The forearm was then pronated which allowed for reduction of the radial head with a large palpable clunk. The radial head was found to be stable and concentrically reduced through flexion and extension with the forearm held in pronation. Range of motion of the elbow showed no crepitus or evidence of intra-articular hardware. Fluoroscopic imaging confirmed an excellent overall reduction and appropriate placement of all hardware and maintenance of concentric reduction of the radial head. Wounds were copiously irrigated with sterile saline and closed in a layered fashion. A sterile compressive dressing was applied followed by a well-padded long arm splint with the elbow at 90 degrees and forearm in a fully pronated position. AP and lateral x-rays again showed concentric reduction of the radial head. Once the patient was awakened from anesthesia, they were transported to the PACU in stable condition, having tolerated surgery well with no obvious complications.     Postoperative Plan  NWB through arm in splint  Keep splint c/d/i  Finger ROM  24hrs post-op abx  Pain control  Ice/elevate  F/u Dr Chico Wick in 7-10 days    This operative report was prepared and signed by Armando Cockayne, MD at 03/21/21, 12:40 PM

## 2021-03-21 NOTE — H&P
Hospitalist History and Physical  Jenifer Angelo MD  Answering service: [phone] from in house phone        Date of Service:  3/20/2021  NAME:  Margie Soria  :  1940  MRN:  [id number]  Primary Care Provider: Gayle Montana MD    Chief Complaint:   Chief Complaint   Patient presents with    Fall       History of Present Illness:     Margie Soria is a 79 y.o. female with past medical history of GERD, asthma, A. fib and major depression who comes in after a fall. Patient is awake, alert and oriented able to answer my question follow my request.  Patient reports that she was in her usual state of health and doing well. She recently was diagnosed with A. fib and was due to start Eliquis tomorrow. She reports that she was feeling well this morning and was going outside to her garden when she tripped and fell and fell face first on the concrete. She denies loss of consciousness but developed acute onset of left elbow pain and facial pain with bleeding. She denies any neck pain, numbness, tingling or weakness. She got up by herself and called EMS. Her bleeding had stopped before the emesis on the scene. Patient complained of severe pain on the left elbow which is 8/10 she had only taken her baby aspirin this morning otherwise was doing well and was brought to ED for evaluation. Patient denies any chest pain complaints of breath, dizziness, headache, blurring of vision or any other medical complaints. In the ED hemodynamically stable, required 8 stitches on the facial laceration, CT head was negative for acute pathology and x-ray of the upper extremity showed left olecranon fracture with radial head displacement and osteopenic left wrist.  Patient was seen by cardiology.   Patient was due to start Eliquis but has not started it yet likely to cardiology discontinued Eliquis further 2 weeks at least.  Patient was seen by orthopedics and they plan to do open reduction internal fixation in the morning. Patient is in agreement to hospital stay and further management. Chart reviewed at length. Review of Systems:  Pertinent positives noted in HPI. All other systems were reviewed and are negative. Past Medical and surgical history:   Past Medical History:   Diagnosis Date    Asthma     Atrial fibrillation ([address] 2021    Colon polyps     tubular adenoma 2017    Colon polyps     Fracture 2017    Right humerus (pt fell)    GERD (gastroesophageal reflux disease)     Glaucoma     Humeral fracture     2017    Hyperlipidemia     Macular edema     Major depression in partial remission ([address]     Mitral valve prolapse     Osteopenia     TIA (transient ischemic attack)       Past Surgical History:   Procedure Laterality Date    COLONOSCOPY N/A 2020    COLONOSCOPY performed by Oliver Dotson MD at [facility] ENDOSCOPY    HX BREAST BIOPSY Left     benign surgical bx done yrs ago    HX CATARACT REMOVAL      HX  SECTION      HX CHOLECYSTECTOMY      HX COLONOSCOPY      ,     HX TONSILLECTOMY      HX WISDOM TEETH EXTRACTION         Home medications:  Prior to Admission medications    Medication Sig Start Date End Date Taking? Authorizing Provider   apixaban (ELIQUIS) 5 mg tablet Take 1 Tab by mouth two (2) times a day. 3/19/21  Yes Lyric Chavarria MD   metoprolol succinate (Toprol XL) 25 mg XL tablet Take 0.5 Tabs by mouth nightly. Indications: high blood pressure, multifocal atrial tachycardia, an unusually high heart rate at rest, paroxysmal supraventricular tachycardia 3/19/21  Yes Lyric Chavarria MD   levothyroxine (SYNTHROID) 50 mcg tablet TAKE ONE TABLET BY MOUTH EVERY MORNING BEFORE BREAKFAST 3/11/21  Yes Mary Carmen Mcghee MD   dorzolamide (TRUSOPT) 2 % ophthalmic solution Administer 2 Drops to both eyes three (3) times daily.    Yes Provider, Historical   citalopram (CELEXA) 20 mg tablet TAKE 1 AND 1/2 TABLETS BY MOUTH EVERY DAY. 20 Yes Cyrus Romero MD   omeprazole (PRILOSEC) 20 mg capsule TAKE 1 CAPSULE BY MOUTH EVERY DAY. 11/2/20  Yes Cyrus Romero MD   cholecalciferol, VITAMIN D3, (VITAMIN D3) 5,000 unit tab tablet Take  by mouth daily. Yes Provider, Historical   aspirin 81 mg chewable tablet Take 81 mg by mouth daily. Yes Provider, Historical   latanoprost (XALATAN) 0.005 % ophthalmic solution Administer 1 Drop to both eyes nightly. Yes Provider, Historical   olopatadine (Pataday) 0.2 % drop ophthalmic solution Administer 1 Drop to both eyes daily. Provider, Historical       Allergies:  No Known Allergies    Family history:   Family History   Problem Relation Age of Onset    Hypertension Mother 80    Coronary Artery Disease Mother     Alcohol abuse Father     Stroke Father     Arrhythmia Brother          a fib    Hypertension Brother     Cancer Maternal Grandmother         SOCIAL HISTORY:  Patient resides at Home. Patient ambulates with out device. Smoking history: reports that she has quit smoking. Her smoking use included cigarettes. She has a 2.50 pack-year smoking history. She has never used smokeless tobacco.  Drug History:  reports current drug use. Drugs: Prescription and OTC. Alcohol history:  reports current alcohol use of about 7.0 standard drinks of alcohol per week.     Objective:       Physical Exam:   Visit Vitals  BP (!) 154/62   Pulse 82   Temp 97.8 °F (36.6 °C)   Resp 16   Ht 5' 5\" (1.651 m)   Wt 83.9 kg (185 lb)   LMP 09/06/1992   SpO2 97%   BMI 30.79 kg/m²     General appearance: alert, cooperative, no distress, appears stated age  Head: Left facial laceration around the left eyebrow which is stitched with bruising around the left orbit and swelling, no active bleeding  Eyes: Left eye is more dilated compared to right  Throat: Lips, mucosa, and tongue normal. Teeth and gums normal  Lungs: clear to auscultation bilaterally, no wheezing rales or rhonchi  Heart: regular rate and rhythm, S1, S2 normal, no murmur, click, rub or gallop  Abdomen: soft, non-tender. Bowel sounds normal. No masses,  no organomegaly  Extremities: Left upper extremity is wrapped in Ace wrap and in a sling  Skin: Skin color, texture, turgor normal. No rashes or lesions  Neurologic: Grossly normal      Laboratory and other diagnostic Data Review: All diagnostic labs and studies have been reviewed. Xr Shoulder Lt Ap/lat Min 2 V    Result Date: 3/20/2021  EXAM:  Left shoulder radiograph INDICATION:   Left shoulder pain COMPARISON: None. FINDINGS: 3 views of the left shoulder demonstrate no fracture, dislocation or other abnormality. Normal left shoulder. Xr Elbow Lt Min 3 V    Result Date: 3/20/2021  EXAM: XR ELBOW LT MIN 3 V INDICATION: Left elbow pain after fall COMPARISON: None. FINDINGS: Three views of the left elbow demonstrate a comminuted proximal ulnar fracture with a fracture line extending into the olecranon. The radial head is posteriorly dislocated. There is an elbow deformity with soft tissue swelling. A large joint effusion is seen. Acute fracture dislocation of the left elbow    Xr Wrist Lt Ap/lat/obl Min 3v    Result Date: 3/20/2021  EXAM: XR WRIST LT AP/LAT/OBL MIN 3V INDICATION: fall. COMPARISON: 2019. FINDINGS: Three views of the left wrist demonstrate no fracture or other acute osseous or articular abnormality. There is diffuse osteopenia. Osteoarthritis is noted at the first metacarpal carpal junction. The soft tissues are within normal limits. Moderate soft tissue swelling is seen. Osteopenic left wrist. Moderate soft tissue swelling. Ct Head Wo Cont    Result Date: 3/20/2021  EXAM: CT HEAD WO CONT INDICATION: fall COMPARISON: 4/16/2020. CONTRAST: None. TECHNIQUE: Unenhanced CT of the head was performed using 5 mm images. Brain and bone windows were generated. Coronal and sagittal reformats.  CT dose reduction was achieved through use of a standardized protocol tailored for this examination and automatic exposure control for dose modulation. FINDINGS: The ventricles and sulci are normal in size, shape and configuration. . There is no significant white matter disease. There is no intracranial hemorrhage, extra-axial collection, or mass effect. The basilar cisterns are open. No CT evidence of acute infarct. The bone windows demonstrate no abnormalities. The visualized portions of the paranasal sinuses and mastoid air cells are clear. No evidence of acute process     Xr Knee Lt 3 V    Result Date: 3/20/2021  EXAM: XR KNEE LT 3 V INDICATION: Left knee pain after fall. COMPARISON: None. FINDINGS: Three views of the left knee demonstrate no fracture or other acute osseous or articular abnormality. There is chondrocalcinosis. There is no effusion. No acute abnormality. Gumaro 3d Ronnie W Mammo Bi Screening Incl Cad    Result Date: 2/23/2021  STUDY: Bilateral digital screening mammogram with 3-D tomosynthesis INDICATION:  Screening. COMPARISON: Prior studies dating back to 2014 BREAST COMPOSITION: The breasts are heterogeneously dense, which may obscure small masses. FINDINGS: Bilateral digital screening mammography was performed and is interpreted in conjunction with a computer assisted detection (CAD) system. Additionally, tomosynthesis of both breasts in the CC and MLO projections was performed. No suspicious masses or calcifications are identified. There has been no significant change. BI-RADS 1: Negative. No mammographic evidence of malignancy. RECOMMENDATIONS: Next screening mammogram is recommended in one year. The patient will be notified of these results.        Patient Vitals for the past 12 hrs:   Temp Pulse Resp BP SpO2   03/20/21 2016    (!) 154/62 97 %   03/20/21 1830    (!) 159/61 97 %   03/20/21 1743    (!) 148/73 97 %   03/20/21 1606 97.8 °F (36.6 °C) 82 16 (!) 165/67 96 %       Recent Labs     03/20/21  1618   WBC 8.5   HGB 13.0   HCT 40.5        Recent Labs 03/20/21  1618      K 4.0   *   CO2 25   BUN 23*   CREA 0.93   GLU 81   CA 8.6     Recent Labs     03/20/21  1618   ALT 30      TBILI 0.3   TP 7.3   ALB 3.4*   GLOB 3.9     No results for input(s): INR, PTP, APTT, INREXT in the last 72 hours. No results for input(s): FE, TIBC, PSAT, FERR in the last 72 hours. No results found for: FOL, RBCF   No results for input(s): PH, PCO2, PO2 in the last 72 hours. No results for input(s): CPK, CKNDX, TROIQ in the last 72 hours. No lab exists for component: CPKMB  Lab Results   Component Value Date/Time    Cholesterol, total 199 08/14/2020 01:53 PM    HDL Cholesterol 59 08/14/2020 01:53 PM    LDL, calculated 118 (H) 08/14/2020 01:53 PM    Triglyceride 108 08/14/2020 01:53 PM     No results found for: GLUCPOC  No results found for: COLOR, APPRN, SPGRU, REFSG, JANE, PROTU, GLUCU, KETU, BILU, UROU, TOY, LEUKU, GLUKE, EPSU, BACTU, WBCU, RBCU, CASTS, UCRY    Assessment:   Given the patient's current clinical presentation, I have a high level of concern for decompensation if discharged from the emergency department. Complex decision making was performed, which includes reviewing the patient's available past medical records, laboratory results, and x-ray films. My assessment of this patient's clinical condition and my plan of care is as follows.       Active Problems:      Left elbow fracture, closed, initial encounter (3/20/2021)        Plan:     Ground-level fall  Left olecranon fracture with radial head dislocation  Facial laceration near left eye with hematoma formation, POA  Admit to Ortho floor  N.p.o. after midnight  Pain control  Plan for surgical correction in the morning  Orthopedics on board, recommendations noted  We will check CT orbit in the morning    PAF, currently in normal sinus rhythm  Patient was started on Eliquis 5 mg p.o. twice daily yesterday (she has not started it yet and is due to start tomorrow) via virtual visit given [id number] score of 6 but given the fall and facial laceration, cardiology was consulted in the ED and they recommend deferring anticoagulation for now for at least 2 weeks, patient may need watchman device. Continue metoprolol    GERDcontinue PPI    History of TIA in 2015had difficulty speaking, currently on aspirinhold aspirin for now until surgery is done and then it could be resumed. Hypothyroidism, centesis 5.3, continue Synthroid    Hyperlipidemiamanaged with lifestyle changes      Diet: Cardiac Diet  Activity: Out of bed to chair, fall precautions  DVT prophylaxis: SCD  Isolation precautions: None  Consultations: Ortho and cardiology  Anticipated disposition: Home with family  Code status: Full Code    Admit to inpatient status       Patient was explained about the risk of admission including and not a complete list including risk of falls,fractures,blood clots,allergic reactions,infections. Patient/family also understands and agrees to the treatment plan including medications and side effect profiles and also understand the risk with radiation while undergoing imaging studies. The patient and the family/friends (after permission given by the patient to discuss) understand this and agree with the admission plan. Signed By: Kenney Durham MD     03/20/21  9:17 PM        Patient's emergency contacts:  Extended Emergency Contact Information  Primary Emergency Contact: [address] [place] [address] Phone: [phone]  Mobile Phone: [phone]  Relation: Daughter  Secondary Emergency Contact: [facility] Phone: [phone]  Mobile Phone: [phone]  Relation: Son     Please note that this dictation was completed with [employer], the computer voice recognition software. Quite often unanticipated grammatical, syntax, homophones, and other interpretive errors are inadvertently transcribed by the computer software.   Please disregard these errors. Please excuse any errors that have escaped final proofreading.

## 2021-03-21 NOTE — PROGRESS NOTES
TRANSFER - OUT REPORT:    Verbal report given to Quincy Berger RN (name) on Beth Decker  being transferred to Pre op (unit) for ordered procedure       Report consisted of patients Situation, Background, Assessment and   Recommendations(SBAR). Information from the following report(s) SBAR, ED Summary and MAR was reviewed with the receiving nurse. Lines:   Peripheral IV 03/20/21 (Active)   Site Assessment Clean, dry, & intact 03/20/21 2220   Phlebitis Assessment 0 03/20/21 2220   Infiltration Assessment 0 03/20/21 2220   Dressing Status Clean, dry, & intact 03/20/21 2220   Dressing Type Transparent 03/20/21 2220   Hub Color/Line Status Capped 03/20/21 2220        Opportunity for questions and clarification was provided.

## 2021-03-21 NOTE — ANESTHESIA PROCEDURE NOTES
Peripheral Block    Start time: 3/21/2021 10:01 AM  End time: 3/21/2021 10:08 AM  Performed by: Prasanth Latif MD  Authorized by: Prasanth Latif MD       Pre-procedure:   Indications: procedure for pain and primary anesthetic    Preanesthetic Checklist: patient identified, risks and benefits discussed, site marked, timeout performed, anesthesia consent given and patient being monitored    Timeout Time: 10:01          Block Type:   Block Type:  Supraclavicular  Monitoring:  Standard ASA monitoring, continuous pulse ox, frequent vital sign checks, heart rate, responsive to questions and oxygen  Injection Technique:  Single shot  Procedures: ultrasound guided and nerve stimulator    Patient Position: supine  Prep: chlorhexidine    Location:  Supraclavicular  Needle Type:  Stimuplex  Needle Gauge:  22 G  Needle Localization:  Anatomical landmarks, nerve stimulator and ultrasound guidance  Motor Response comment:   Motor Response: minimal motor response >0.4 mA   Medication Injected:  Ropivacaine (PF) (NAROPIN)(0.5%) 5 mg/mL injection, 30 mL  Med Admin Time: 3/21/2021 10:08 AM    Assessment:  Number of attempts:  1  Injection Assessment:  Incremental injection every 5 mL, negative aspiration for CSF, no paresthesia, negative aspiration for blood and no intravascular symptoms  Patient tolerance:  Patient tolerated the procedure well with no immediate complications

## 2021-03-21 NOTE — BRIEF OP NOTE
Brief Postoperative Note    Patient: Yue Delarosa  YOB: 1940  MRN: [id number]    Date of Procedure: 3/21/2021     Pre-Op Diagnosis: FRACTURED LEFT ELBOW,  Elbow dislocation    Post-Op Diagnosis: Same as preoperative diagnosis.       Procedure(s):  ELBOW OPEN REDUCTION INTERNAL FIXATION    Surgeon(s):  MD Emily Oropeza MD    Surgical Assistant: Surg Asst-1: Fredy Jama    Anesthesia: General     Estimated Blood Loss (mL): 94ML    Complications: None    Specimens: * No specimens in log *     Implants: * No implants in log *    Drains: * No LDAs found *    Findings: dislocation of radial head,  Comminuted ulna fracture    Electronically Signed by Kristine Barrios MD on 3/21/2021 at 12:03 PM

## 2021-03-21 NOTE — PERIOP NOTES
TRANSFER - OUT REPORT:    Verbal report given to Debby Mari RN on Yue Delarosa  being transferred to [address] [place] [address] for routine post - op       Report consisted of patients Situation, Background, Assessment and   Recommendations(SBAR). Time Pre op antibiotic given:10:40  Anesthesia Stop time:1259  Yuen Present on Transfer to floor:none  Order for Yuen on Chart:none  Discharge Prescriptions with Chart:none    Information from the following report(s) OR Summary, Procedure Summary, Intake/Output, MAR, Accordion, Recent Results, Med Rec Status and Cardiac Rhythm nsr was reviewed with the receiving nurse. Opportunity for questions and clarification was provided. Is the patient on 02? YES       L/Min 2 liters nasal cannula           Is the patient on a monitor? YES    Is the nurse transporting with the patient? NO    Surgical Waiting Area notified of patient's transfer from PACU?  YES      The following personal items collected during your admission accompanied patient upon transfer:   Dental Appliance: Dental Appliances: None  Vision: Visual Aid: Glasses  Hearing Aid:    Jewelry:    Clothing:    Other Valuables:    Valuables sent to safe:

## 2021-03-21 NOTE — PROGRESS NOTES
[facility]                                                                                          Hospitalist Progress Note  Barbi Doan MD  Answering service: [phone] from in house phone        Date of Service:  3/21/2021  NAME:  Kendra Odell  :  1940  MRN:  [id number]      Admission Summary:   Kendra Odell is a 79 y.o. female with past medical history of GERD, asthma, A. fib and major depression who comes in after a fall    Interval history / Subjective:    OR per ortho, Left olecranon fracture with radial head dislocation     Assessment & Plan:     Ground-level fall with Left olecranon fracture with radial head dislocation  Facial laceration near left eye with hematoma formation, POA  OR per ortho  Pain control  CT orbit 3/21 - No fracture. No change      PAF, currently in normal sinus rhythm  Patient was started on Eliquis 5 mg p.o. twice daily yesterday (she has not started it yet and is due to start tomorrow) via virtual visit given [id number] score of 6 but given the fall and facial laceration, cardiology was consulted in the ED and they recommend deferring anticoagulation for now for at least 2 weeks, patient may need watchman device.   Continue metoprolol     GERDcontinue PPI     History of TIA in had difficulty speaking, currently on aspirinhold aspirin for now until surgery is done and then it could be resumed.     Hypothyroidism, continue Synthroid     Hyperlipidemiamanaged with lifestyle changes    Code status: Full  DVT prophylaxis: SCD    Care Plan discussed with: Patient/Family and Nurse  Anticipated Disposition: Home w/Family and SNF/LTC  Anticipated Discharge: 24 hours to 50 hours     Hospital Problems  Date Reviewed: 3/19/2021          Codes Class Noted POA    Left elbow fracture, closed, initial encounter ICD-10-CM: H86.874L  ICD-9-CM: 812.40  3/20/2021 Unknown                Review of Systems:   A comprehensive review of systems was negative. Vital Signs:    Last 24hrs VS reviewed since prior progress note. Most recent are:  Visit Vitals  BP (!) 109/59   Pulse 74   Temp 98.5 °F (36.9 °C)   Resp 16   Ht 5' 5\" (1.651 m)   Wt 83.9 kg (185 lb)   SpO2 98%   BMI 30.79 kg/m²       No intake or output data in the 24 hours ending 03/21/21 1050     Physical Examination:     I had a face to face encounter with this patient and independently examined them on 3/21/2021 as outlined below:          Constitutional:  No acute distress, cooperative, pleasant    ENT:  Oral mucosa moist, oropharynx benign. Resp:  CTA bilaterally. No wheezing/rhonchi/rales. No accessory muscle use   CV:  Regular rhythm, normal rate, no murmurs, gallops, rubs    GI:  Soft, non distended, non tender. normoactive bowel sounds, no hepatosplenomegaly     Musculoskeletal:  No edema, warm, 2+ pulses throughout    Neurologic:  Moves all extremities. AAOx3, CN II-XII reviewed     Psych:  Good insight, Not anxious nor agitated. Data Review:    I personally reviewed  Image and labs      Labs:     Recent Labs     03/21/21  0443 03/20/21  1618   WBC 12.1* 8.5   HGB 12.9 13.0   HCT 40.2 40.5    242     Recent Labs     03/21/21  0443 03/20/21  1618    139   K 4.0 4.0    109*   CO2 23 25   BUN 23* 23*   CREA 0.84 0.93   * 81   CA 8.4* 8.6     Recent Labs     03/20/21  1618   ALT 30      TBILI 0.3   TP 7.3   ALB 3.4*   GLOB 3.9     No results for input(s): INR, PTP, APTT, INREXT in the last 72 hours. No results for input(s): FE, TIBC, PSAT, FERR in the last 72 hours. No results found for: FOL, RBCF   No results for input(s): PH, PCO2, PO2 in the last 72 hours. No results for input(s): CPK, CKNDX, TROIQ in the last 72 hours.     No lab exists for component: CPKMB  Lab Results   Component Value Date/Time    Cholesterol, total 199 08/14/2020 01:53 PM    HDL Cholesterol 59 08/14/2020 01:53 PM    LDL, calculated 118 (H) 08/14/2020 01:53 PM    Triglyceride 108 08/14/2020 01:53 PM     No results found for: GLUCPOC  No results found for: COLOR, APPRN, SPGRU, REFSG, JANE, PROTU, GLUCU, KETU, BILU, UROU, TOY, LEUKU, GLUKE, EPSU, BACTU, WBCU, RBCU, CASTS, UCRY      Medications Reviewed:     Current Facility-Administered Medications   Medication Dose Route Frequency    ceFAZolin (ANCEF) 2 g in sterile water (preservative free) 20 mL IV syringe  2 g IntraVENous ON CALL TO OR    lactated Ringers infusion 1,000 mL  1,000 mL IntraVENous CONTINUOUS    0.9% sodium chloride infusion  25 mL/hr IntraVENous CONTINUOUS    lidocaine (PF) (XYLOCAINE) 10 mg/mL (1 %) injection 0.1 mL  0.1 mL SubCUTAneous PRN    fentaNYL citrate (PF) injection 50 mcg  50 mcg IntraVENous PRN    midazolam (VERSED) injection 1 mg  1 mg IntraVENous PRN    midazolam (VERSED) injection 1 mg  1 mg IntraVENous PRN    glycopyrrolate (ROBINUL) injection 0.2 mg  0.2 mg IntraVENous ONCE PRN    acetaminophen (TYLENOL) tablet 650 mg  650 mg Oral ONCE    ropivacaine (PF) (NAROPIN) 5 mg/mL (0.5 %) injection 150 mg  30 mL Peripheral Nerve Block PRN    fentaNYL citrate (PF) 50 mcg/mL injection        midazolam (VERSED) 1 mg/mL injection        sodium chloride (NS) flush 5-40 mL  5-40 mL IntraVENous Q8H    sodium chloride (NS) flush 5-40 mL  5-40 mL IntraVENous PRN    acetaminophen (TYLENOL) tablet 650 mg  650 mg Oral Q6H PRN    Or    acetaminophen (TYLENOL) suppository 650 mg  650 mg Rectal Q6H PRN    polyethylene glycol (MIRALAX) packet 17 g  17 g Oral DAILY PRN    promethazine (PHENERGAN) tablet 12.5 mg  12.5 mg Oral Q6H PRN    Or    ondansetron (ZOFRAN) injection 4 mg  4 mg IntraVENous Q6H PRN    pantoprazole (PROTONIX) tablet 40 mg  40 mg Oral ACB    latanoprost (XALATAN) 0.005 % ophthalmic solution 1 Drop  1 Drop Both Eyes QPM    cholecalciferol (VITAMIN D3) (1000 Units /25 mcg) tablet 5,000 Units  5,000 Units Oral DAILY    dorzolamide (TRUSOPT) 2 % ophthalmic solution 2 Drop  2 Drop Both Eyes TID    metoprolol succinate (TOPROL-XL) XL tablet 12.5 mg  12.5 mg Oral QHS    levothyroxine (SYNTHROID) tablet 50 mcg  50 mcg Oral 6am    ketotifen (ZADITOR) 0.025 % (0.035 %) ophthalmic solution 1 Drop  1 Drop Both Eyes BID    traMADoL (ULTRAM) tablet 50 mg  50 mg Oral Q6H PRN    citalopram (CELEXA) tablet 30 mg  30 mg Oral DAILY     ______________________________________________________________________  EXPECTED LENGTH OF STAY: - - -  ACTUAL LENGTH OF STAY:          1                 Arash Nascimento MD

## 2021-03-22 VITALS
WEIGHT: 202.38 LBS | HEIGHT: 65 IN | DIASTOLIC BLOOD PRESSURE: 43 MMHG | TEMPERATURE: 97.6 F | SYSTOLIC BLOOD PRESSURE: 155 MMHG | RESPIRATION RATE: 16 BRPM | BODY MASS INDEX: 33.72 KG/M2 | HEART RATE: 58 BPM | OXYGEN SATURATION: 94 %

## 2021-03-22 PROBLEM — S42.402A LEFT ELBOW FRACTURE, CLOSED, INITIAL ENCOUNTER: Status: RESOLVED | Noted: 2021-03-20 | Resolved: 2021-03-22

## 2021-03-22 LAB
ANION GAP SERPL CALC-SCNC: 5 MMOL/L (ref 5–15)
BUN SERPL-MCNC: 19 MG/DL (ref 6–20)
BUN/CREAT SERPL: 18 (ref 12–20)
CALCIUM SERPL-MCNC: 8.5 MG/DL (ref 8.5–10.1)
CHLORIDE SERPL-SCNC: 108 MMOL/L (ref 97–108)
CO2 SERPL-SCNC: 26 MMOL/L (ref 21–32)
CREAT SERPL-MCNC: 1.08 MG/DL (ref 0.55–1.02)
GLUCOSE SERPL-MCNC: 96 MG/DL (ref 65–100)
POTASSIUM SERPL-SCNC: 4.2 MMOL/L (ref 3.5–5.1)
SODIUM SERPL-SCNC: 139 MMOL/L (ref 136–145)

## 2021-03-22 PROCEDURE — 74011000250 HC RX REV CODE- 250: Performed by: ORTHOPAEDIC SURGERY

## 2021-03-22 PROCEDURE — 74011250637 HC RX REV CODE- 250/637: Performed by: HOSPITALIST

## 2021-03-22 PROCEDURE — 97116 GAIT TRAINING THERAPY: CPT

## 2021-03-22 PROCEDURE — 80048 BASIC METABOLIC PNL TOTAL CA: CPT

## 2021-03-22 PROCEDURE — 74011250637 HC RX REV CODE- 250/637: Performed by: ORTHOPAEDIC SURGERY

## 2021-03-22 PROCEDURE — 97535 SELF CARE MNGMENT TRAINING: CPT

## 2021-03-22 PROCEDURE — 97161 PT EVAL LOW COMPLEX 20 MIN: CPT

## 2021-03-22 PROCEDURE — 36415 COLL VENOUS BLD VENIPUNCTURE: CPT

## 2021-03-22 PROCEDURE — 74011250636 HC RX REV CODE- 250/636: Performed by: ORTHOPAEDIC SURGERY

## 2021-03-22 PROCEDURE — 97165 OT EVAL LOW COMPLEX 30 MIN: CPT

## 2021-03-22 RX ORDER — CEPHALEXIN 250 MG/1
500 CAPSULE ORAL 2 TIMES DAILY
Status: DISCONTINUED | OUTPATIENT
Start: 2021-03-22 | End: 2021-03-22 | Stop reason: HOSPADM

## 2021-03-22 RX ORDER — TRAMADOL HYDROCHLORIDE 50 MG/1
50 TABLET ORAL
Qty: 12 TAB | Refills: 0 | Status: SHIPPED | OUTPATIENT
Start: 2021-03-22 | End: 2021-03-25

## 2021-03-22 RX ADMIN — LEVOTHYROXINE SODIUM 50 MCG: 0.05 TABLET ORAL at 06:39

## 2021-03-22 RX ADMIN — PANTOPRAZOLE SODIUM 40 MG: 40 TABLET, DELAYED RELEASE ORAL at 07:02

## 2021-03-22 RX ADMIN — DORZOLAMIDE HYDROCHLORIDE 2 DROP: 20 SOLUTION/ DROPS OPHTHALMIC at 09:28

## 2021-03-22 RX ADMIN — ACETAMINOPHEN 650 MG: 325 TABLET ORAL at 00:31

## 2021-03-22 RX ADMIN — KETOTIFEN FUMARATE 1 DROP: 0.35 SOLUTION/ DROPS OPHTHALMIC at 09:29

## 2021-03-22 RX ADMIN — ACETAMINOPHEN 650 MG: 325 TABLET ORAL at 06:38

## 2021-03-22 RX ADMIN — DOCUSATE SODIUM 50 MG AND SENNOSIDES 8.6 MG 1 TABLET: 8.6; 5 TABLET, FILM COATED ORAL at 09:27

## 2021-03-22 RX ADMIN — POLYETHYLENE GLYCOL 3350 17 G: 17 POWDER, FOR SOLUTION ORAL at 09:27

## 2021-03-22 RX ADMIN — ASPIRIN 81 MG: 81 TABLET, CHEWABLE ORAL at 09:27

## 2021-03-22 RX ADMIN — OXYCODONE 5 MG: 5 TABLET ORAL at 13:16

## 2021-03-22 RX ADMIN — OXYCODONE 5 MG: 5 TABLET ORAL at 06:39

## 2021-03-22 RX ADMIN — SODIUM CHLORIDE 125 ML/HR: 9 INJECTION, SOLUTION INTRAVENOUS at 03:15

## 2021-03-22 RX ADMIN — Medication 10 ML: at 07:02

## 2021-03-22 RX ADMIN — CEFAZOLIN SODIUM 2 G: 1 INJECTION, POWDER, FOR SOLUTION INTRAMUSCULAR; INTRAVENOUS at 02:37

## 2021-03-22 RX ADMIN — OXYCODONE 5 MG: 5 TABLET ORAL at 02:15

## 2021-03-22 RX ADMIN — CEPHALEXIN 500 MG: 250 CAPSULE ORAL at 13:16

## 2021-03-22 RX ADMIN — ACETAMINOPHEN 650 MG: 325 TABLET ORAL at 13:16

## 2021-03-22 NOTE — DISCHARGE SUMMARY
Discharge Summary       PATIENT ID: Keshawn Zimmerman  MRN: [id number]   YOB: 1940    DATE OF ADMISSION: 3/20/2021  3:49 PM    DATE OF DISCHARGE: 3/22/21   PRIMARY CARE PROVIDER: Guyann Kussmaul, MD     ATTENDING PHYSICIAN: Tresea Siemens  DISCHARGING PROVIDER: Jewel Mederos MD    To contact this individual call [phone] and ask the  to page. If unavailable ask to be transferred the Adult Hospitalist Department. CONSULTATIONS: IP CONSULT TO ORTHOPEDIC SURGERY    PROCEDURES/SURGERIES: Procedure(s):  ELBOW OPEN REDUCTION INTERNAL FIXATION    ADMITTING DIAGNOSES & HOSPITAL COURSE:   Elida Atkinson a 79 y.o. female with past medical history of GERD, asthma, A. fib and major depression who comes in after a fall      Assessment & Plan:      Ground-level fall with Left olecranon fracture with radial head dislocation  Facial laceration near left eye with hematoma formation, POA  S/p Open reduction internal fixation of left elbow [place] fracture dislocation  Pain control on fátima / tramadol f/u with ortho as out pt   CT orbit 3/21 - No fracture. No change      PAF, currently in normal sinus rhythm  Patient was started on Eliquis 5 mg p.o. twice daily yesterday (she has not started it yet and is due to start tomorrow) via virtual visit given [id number] score of 6 but given the fall and facial laceration, cardiology was consulted in the ED and they recommend deferring anticoagulation for now for at least 2 weeks, patient may need watchman device.   Continue metoprolol     GERDcontinue PPI     History of TIA in 2015had difficulty speaking, currently on aspirin     Hypothyroidism, continue Synthroid                DISCHARGE DIAGNOSES / PLAN:      1. D/c home with [facility]     ADDITIONAL CARE RECOMMENDATIONS:        PENDING TEST RESULTS:   At the time of discharge the following test results are still pending:     FOLLOW UP APPOINTMENTS:    Follow-up Information     Follow up With Specialties Details Why Contact Info    Genoveva Mcghee MD Internal Medicine In 1 week  [address] [facility] [phone]               DIET: Cardiac Diet    ACTIVITY: Activity as tolerated    WOUND CARE:     EQUIPMENT needed:       DISCHARGE MEDICATIONS:  Current Discharge Medication List      START taking these medications    Details   traMADoL (ULTRAM) 50 mg tablet Take 1 Tab by mouth every six (6) hours as needed for Pain for up to 3 days. Max Daily Amount: 200 mg. Qty: 12 Tab, Refills: 0    Associated Diagnoses: Left elbow fracture, closed, initial encounter         CONTINUE these medications which have NOT CHANGED    Details   metoprolol succinate (Toprol XL) 25 mg XL tablet Take 0.5 Tabs by mouth nightly. Indications: high blood pressure, multifocal atrial tachycardia, an unusually high heart rate at rest, paroxysmal supraventricular tachycardia  Qty: 90 Tab, Refills: 3      levothyroxine (SYNTHROID) 50 mcg tablet TAKE ONE TABLET BY MOUTH EVERY MORNING BEFORE BREAKFAST  Qty: 30 Tab, Refills: 0    Associated Diagnoses: Acquired hypothyroidism      dorzolamide (TRUSOPT) 2 % ophthalmic solution Administer 2 Drops to both eyes three (3) times daily. citalopram (CELEXA) 20 mg tablet TAKE 1 AND 1/2 TABLETS BY MOUTH EVERY DAY. Qty: 135 Tab, Refills: 0    Associated Diagnoses: Recurrent major depressive disorder, in partial remission (HCC)      omeprazole (PRILOSEC) 20 mg capsule TAKE 1 CAPSULE BY MOUTH EVERY DAY. Qty: 90 Cap, Refills: 0      cholecalciferol, VITAMIN D3, (VITAMIN D3) 5,000 unit tab tablet Take  by mouth daily. aspirin 81 mg chewable tablet Take 81 mg by mouth daily. latanoprost (XALATAN) 0.005 % ophthalmic solution Administer 1 Drop to both eyes nightly. olopatadine (Pataday) 0.2 % drop ophthalmic solution Administer 1 Drop to both eyes daily.          STOP taking these medications       apixaban (ELIQUIS) 5 mg tablet Comments:   Reason for Stopping:                 NOTIFY YOUR PHYSICIAN FOR ANY OF THE FOLLOWING:   Fever over 101 degrees for 24 hours. Chest pain, shortness of breath, fever, chills, nausea, vomiting, diarrhea, change in mentation, falling, weakness, bleeding. Severe pain or pain not relieved by medications. Or, any other signs or symptoms that you may have questions about. DISPOSITION:  x  Home With:   OT  PT x HH  RN       Long term SNF/Inpatient Rehab    Independent/assisted living    Hospice    Other:       PATIENT CONDITION AT DISCHARGE:     Functional status    Poor    x Deconditioned     Independent      Cognition    x Lucid     Forgetful     Dementia      Catheters/lines (plus indication)    Yuen     PICC     PEG    x None      Code status   x  Full code     DNR      PHYSICAL EXAMINATION AT DISCHARGE:  General:          Alert, cooperative, no distress, appears stated age. HEENT:           Atraumatic, anicteric sclerae, pink conjunctivae                          No oral ulcers, mucosa moist, throat clear, dentition fair  Neck:               Supple, symmetrical  Lungs:             Clear to auscultation bilaterally. No Wheezing or Rhonchi. No rales. Chest wall:      No tenderness  No Accessory muscle use. Heart:              Regular  rhythm,  No  murmur   No edema  Abdomen:        Soft, non-tender. Not distended. Bowel sounds normal  Extremities:     No cyanosis. No clubbing,                            Skin turgor normal, Capillary refill normal  Skin:                Not pale. Not Jaundiced  No rashes   Psych:             Not anxious or agitated.   Neurologic:      Alert, moves all extremities, answers questions appropriately and responds to commands       CHRONIC MEDICAL DIAGNOSES:  Problem List as of 3/22/2021 Date Reviewed: 3/19/2021          Codes Class Noted - Resolved    Cough ICD-10-CM: R05  ICD-9-CM: 786.2  9/25/2018 - Present        TIA (transient ischemic attack) ICD-10-CM: G45.9  ICD-9-CM: 435.9  Unknown - Present        Osteopenia ICD-10-CM: M85.80  ICD-9-CM: 733.90  Unknown - Present        Hyperlipidemia ICD-10-CM: E78.5  ICD-9-CM: 272.4  Unknown - Present        Mitral valve prolapse ICD-10-CM: I34.1  ICD-9-CM: 424.0  Unknown - Present        Glaucoma ICD-10-CM: H40.9  ICD-9-CM: 365.9  Unknown - Present        Major depression in partial remission (HCC) ICD-10-CM: F32.4  ICD-9-CM: 296.25  Unknown - Present        GERD (gastroesophageal reflux disease) ICD-10-CM: K21.9  ICD-9-CM: 530.81  Unknown - Present        Colon polyps ICD-10-CM: K63.5  ICD-9-CM: 211.3  Unknown - Present        Asthma ICD-10-CM: J45.909  ICD-9-CM: 493.90  Unknown - Present        RESOLVED: Wrist fracture ICD-10-CM: S62.109A  ICD-9-CM: 814.00  3/20/2021 - 3/20/2021        RESOLVED: Left elbow fracture, closed, initial encounter ICD-10-CM: S42.402A  ICD-9-CM: 812.40  3/20/2021 - 3/22/2021              Greater than 30  minutes were spent with the patient on counseling and coordination of care    Signed:   Jewel Mederos MD  3/22/2021  1:15 PM

## 2021-03-22 NOTE — PROGRESS NOTES
Problem: Self Care Deficits Care Plan (Adult)  Goal: *Acute Goals and Plan of Care (Insert Text)  Description:   FUNCTIONAL STATUS PRIOR TO ADMISSION: Patient was independent and active without use of DME.     HOME SUPPORT: The patient lived alone with brother in apartment upstairs to provide assistance. Occupational Therapy Goals  Initiated 3/22/2021  1. Patient will perform bathing with supervision/set-up within 7 day(s). 2.  Patient will perform lower body dressing with supervision/set-up within 7 day(s). 3.  Patient will perform upper body dressing with supervision/set-up within 7 day(s). 4.  Patient will perform toilet transfers with supervision/set-up within 7 day(s). 5.  Patient will perform all aspects of toileting with supervision/set-up within 7 day(s). 6.  Patient will utilize energy conservation techniques during functional activities with verbal cues within 7 day(s). Outcome: Not Met   OCCUPATIONAL THERAPY EVALUATION  Patient: Ivania Hernandez (61 y.o. female)  Date: 3/22/2021  Primary Diagnosis: Wrist fracture [S62.109A]  Left elbow fracture, closed, initial encounter [S42.402A]  Procedure(s) (LRB):  ELBOW OPEN REDUCTION INTERNAL FIXATION (Left) 1 Day Post-Op   Precautions:   Fall(left UE NWB)    ASSESSMENT  Based on the objective data described below, the patient presents with impaired higher level balance, mobility, activity tolerance and use of LUE necessary in ADL tasks s/p GLF in forest with L olecranon head fx with radial head dislocation. She is POD 1 from elbow ORIF. Hx of falls at home. Mod A supine > sit, CGA for sit <> stand and self care transfer to toilet with CGA. With toileting she needed min A for clothing mgmt and socks was total A. She needed cues for appropriate spacing to clear LUE in sling through doorways and she reports limited L eye vision. Discussion regarding additional for assist with ADL/IADL tasks with LUE in sling and NWB.   Depending progression, recommend [facility] OT with additional assistance vs SNF. Current Level of Function Impacting Discharge (ADLs/self-care): UB care min A for gown, socks total A, pants mod A    Functional Outcome Measure: The patient scored 55/100 on the Barthel Index outcome measure which is indicative of moderate impairment with ADL tasks. Other factors to consider for discharge: Patient lives at home alone, brother lives [place] however unable to help 24/7. Patient will benefit from skilled therapy intervention to address the above noted impairments. PLAN :  Recommendations and Planned Interventions: self care training, functional mobility training, therapeutic exercise, balance training, therapeutic activities, endurance activities, patient education, home safety training, and family training/education    Frequency/Duration: Patient will be followed by occupational therapy 5 times a week to address goals. Recommendation for discharge: (in order for the patient to meet his/her long term goals)  Therapy up to 5 days/week in SNF setting or an intensive home health therapy program    This discharge recommendation:  Has been made in collaboration with the attending provider and/or case management    IF patient discharges home will need the following DME: AE: long handled bathing, AE: long handled dressing, and shower chair       SUBJECTIVE:   Patient stated Skipper Pilling was going to see this cave but its like a well.     OBJECTIVE DATA SUMMARY:   HISTORY:   Past Medical History:   Diagnosis Date    Asthma     Atrial fibrillation ([address] 03/16/2021    Colon polyps     tubular adenoma 2017    Colon polyps     Fracture 2017    Right humerus (pt fell)    GERD (gastroesophageal reflux disease)     Glaucoma     Humeral fracture     2017    Hyperlipidemia     Macular edema     Major depression in partial remission ([address]     Mitral valve prolapse     Osteopenia     TIA (transient ischemic attack)      Past Surgical History: Procedure Laterality Date    COLONOSCOPY N/A 2020    COLONOSCOPY performed by Coco Haro MD at [address] HX BREAST BIOPSY Left     benign surgical bx done yrs ago    HX CATARACT REMOVAL      HX  SECTION      HX CHOLECYSTECTOMY      HX COLONOSCOPY      ,     HX TONSILLECTOMY      HX WISDOM TEETH EXTRACTION         Expanded or extensive additional review of patient history:     Home Situation  Home Environment: Apartment  # Steps to Enter: 14  Rails to Enter: Yes  Living Alone: Yes  Current DME Used/Available at Home: Cane, straight, Grab bars  Tub or Shower Type: Shower    Hand dominance: Right    EXAMINATION OF PERFORMANCE DEFICITS:  Cognitive/Behavioral Status:                  Hearing: Auditory  Auditory Impairment: None    Vision/Perceptual:                           Acuity: (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)         Range of Motion:  AROM: Generally decreased, functional(except L UE post elbow fx)                         Strength:  Strength: Within functional limits                Coordination:  Coordination: Within functional limits  Fine Motor Skills-Upper: Right Intact    Gross Motor Skills-Upper: Right Intact    Tone & Sensation:  Tone: Normal  Sensation: Intact                      Balance:  Sitting: Intact  Standing: Impaired  Standing - Static: Fair;Good  Standing - Dynamic : Fair;Good;Constant support    Functional Mobility and Transfers for ADLs:  Bed Mobility:  Supine to Sit: Moderate assistance  Scooting: Contact guard assistance    Transfers:  Sit to Stand: Contact guard assistance  Stand to Sit: Contact guard assistance  Bathroom Mobility: Contact guard assistance    ADL Assessment:  Feeding: Setup    Oral Facial Hygiene/Grooming: Contact guard assistance    Bathing: Moderate assistance    Upper Body Dressing:  Moderate assistance    Lower Body Dressing: Maximum assistance    Toileting: Minimum assistance                ADL Intervention and task modifications:   Patient instructed and indicated understanding the benefits of maintaining activity tolerance, functional mobility, and independence with self care tasks during acute stay  to ensure safe return home and to baseline. Encouraged patient to increase frequency and duration OOB, be out of bed for all meals, perform daily ADLs (as approved by RN/MD regarding bathing etc), and performing functional mobility to/from bathroom. Provided education with patient on fall prevention for hospital and at home. This includes not getting OOB/chair/toilet without staff assistance, good lighting, good footwear, and recommended AD use. Patient with good understanding. Ed on sling positioning and repositioning during the day to ensure hand higher than elbow. Grooming  Washing Hands: Contact guard assistance    Upper Body [address]: Moderate assistance    Lower Body Dressing Assistance  Socks: Total assistance (dependent)    Toileting  Bladder Hygiene: Contact guard assistance  Clothing Management: Minimum assistance    Functional Measure:  Barthel Index:    Bathin  Bladder: 5  Bowels: 10  Groomin  Dressin  Feedin  Mobility: 10  Stairs: 5  Toilet Use: 5  Transfer (Bed to Chair and Back): 10  Total: 55/100        The Barthel ADL Index: Guidelines  1. The index should be used as a record of what a patient does, not as a record of what a patient could do. 2. The main aim is to establish degree of independence from any help, physical or verbal, however minor and for whatever reason. 3. The need for supervision renders the patient not independent. 4. A patient's performance should be established using the best available evidence. Asking the patient, friends/relatives and nurses are the usual sources, but direct observation and common sense are also important. However direct testing is not needed.   5. Usually the patient's performance over the preceding 24-48 hours is important, but occasionally longer periods will be relevant. 6. Middle categories imply that the patient supplies over 50 per cent of the effort. 7. Use of aids to be independent is allowed. Miguel Harm., Barthel, D.W. (8831). Functional evaluation: the Barthel Index. [facility] (14)2. RINA Salas, Aneudy Easley., Kirsten Bobby., Nor[place]to, [address] (1999). Measuring the change indisability after inpatient rehabilitation; comparison of the responsiveness of the Barthel Index and Functional [place] Measure. Journal of Neurology, Neurosurgery, and Psychiatry, 66(4), 903-417. CATRACHITA Grimes.A, JUANITO Altamirano, & Ezio Grimes MGIRMA. (2004.) Assessment of post-stroke quality of life in cost-effectiveness studies: The usefulness of the Barthel Index and the EuroQoL-5D. Quality of Life Research, 15, 279-43         Occupational Therapy Evaluation Charge Determination   History Examination Decision-Making   LOW Complexity : Brief history review  LOW Complexity : 1-3 performance deficits relating to physical, cognitive , or psychosocial skils that result in activity limitations and / or participation restrictions  LOW Complexity : No comorbidities that affect functional and no verbal or physical assistance needed to complete eval tasks       Based on the above components, the patient evaluation is determined to be of the following complexity level: LOW   Pain Rating:  Mild pain in elbow    Activity Tolerance:   Fair and SpO2 stable on RA    After treatment patient left in no apparent distress:    Sitting in chair and Call bell within reach    COMMUNICATION/EDUCATION:   The patients plan of care was discussed with: Physical therapist, Registered nurse and Case management. Home safety education was provided and the patient/caregiver indicated understanding. and Patient/family agree to work toward stated goals and plan of care.     This patients plan of care is appropriate for delegation to BIRDIE.    Thank you for this referral.  Henry Herrera, OT  Time Calculation: 30 mins

## 2021-03-22 NOTE — PROGRESS NOTES
I agree with care, assessment and charting on this patient by Leeanne Veliz RN. Bedside and Verbal shift change report given to [facility], RN (oncoming nurse) by Mono Livingston RN (offgoing nurse). Report included the following information SBAR, Kardex, ED Summary, OR Summary, Procedure Summary, Intake/Output, MAR and Recent Results.

## 2021-03-22 NOTE — PROGRESS NOTES
Problem: Mobility Impaired (Adult and Pediatric)  Goal: *Acute Goals and Plan of Care (Insert Text)  Description: FUNCTIONAL STATUS PRIOR TO ADMISSION: Patient was independent and active without use of DME.    HOME SUPPORT PRIOR TO ADMISSION: The patient lived alone. Patient's brother lived in an apartment upstairs. Physical Therapy Goals  Initiated 3/22/2021  1. Patient will move from supine to sit and sit to supine  in bed with modified independence within 7 day(s). 2.  Patient will transfer from bed to chair and chair to bed with modified independence using the least restrictive device within 7 day(s). 3.  Patient will perform sit to stand with modified independence within 7 day(s). 4.  Patient will ambulate with modified independence for 150 feet with the least restrictive device within 7 day(s). 5.  Patient will ascend/descend 5 stairs with 1 handrail(s) with modified independence within 7 day(s). Outcome: Progressing Towards Goal     PHYSICAL THERAPY EVALUATION  Patient: Vincent Quintanilla (6 y.o. female)  Date: 3/22/2021  Primary Diagnosis: Wrist fracture [S62.109A]  Left elbow fracture, closed, initial encounter [S42.402A]  Procedure(s) (LRB):  ELBOW OPEN REDUCTION INTERNAL FIXATION (Left) 1 Day Post-Op   Precautions:        ASSESSMENT  Based on the objective data described below, the patient presents with controlled left UE pain, impaired ROM, and decreased balance following admission for a GLF with subsequent left elbow olecranon fx and subsequent ORIF. She required moderate assist for supine to sit but sitting balance was good. Challenged with standing balance d/t , path deviations and impaired awareness of her increased width d/t left cast. Gait 2x70' with HHA initially and 2nd trial using SPC. Gait quality improved considerably using a cane with improved nhung, decreased path deviations, and SBA for balance. VSS throughout.      Patient reports existing balance trouble and now with and left elbow fx. Concern for her ability to meet her own needs at home d/t limited home support, necessity for DME use, and balance impairment. Recommend discharge either to a rehab setting or home with increased support (either family or private caregiver) and HHPT services. Current Level of Function Impacting Discharge (mobility/balance): CGA without DME, impaired balance      Other factors to consider for discharge: patient reports limited local support     Patient will benefit from skilled therapy intervention to address the above noted impairments. PLAN :  Recommendations and Planned Interventions: bed mobility training, transfer training, gait training, and therapeutic exercises      Frequency/Duration: Patient will be followed by physical therapy:  5 times a week to address goals. Recommendation for discharge: (in order for the patient to meet his/her long term goals)  Increased home support and HHPT vs a short rehab stay        IF patient discharges home will need the following DME: straight cane         SUBJECTIVE:   Patient stated I have had trouble with my balance for a while.     OBJECTIVE DATA SUMMARY:   HISTORY:    Past Medical History:   Diagnosis Date    Asthma     Atrial fibrillation ([facility] [address] 2021    Colon polyps     tubular adenoma 2017    Colon polyps     Fracture 2017    Right humerus (pt fell)    GERD (gastroesophageal reflux disease)     Glaucoma     Humeral fracture     2017    Hyperlipidemia     Macular edema     Major depression in partial remission ([address]     Mitral valve prolapse     Osteopenia     TIA (transient ischemic attack)      Past Surgical History:   Procedure Laterality Date    COLONOSCOPY N/A 2020    COLONOSCOPY performed by Brittany River MD at [facility] ENDOSCOPY    HX BREAST BIOPSY Left     benign surgical bx done yrs ago    HX CATARACT REMOVAL      HX  SECTION      HX CHOLECYSTECTOMY      HX COLONOSCOPY      ,     HX TONSILLECTOMY      HX WISDOM TEETH EXTRACTION         Personal factors and/or comorbidities impacting plan of care:     Home Situation  Home Environment: Apartment  # Steps to Enter: 14  Rails to Enter: Yes  Living Alone: Yes  Current DME Used/Available at Home: Cane, straight, Grab bars  Tub or Shower Type: Shower    EXAMINATION/PRESENTATION/DECISION MAKING:   Critical Behavior:  Neurologic State: Alert  Orientation Level: Oriented X4  Cognition: Appropriate decision making, Follows commands     Hearing: Auditory  Auditory Impairment: None  Skin:    Edema:   Range Of Motion:  AROM: Generally decreased, functional(except L UE post elbow fx)                       Strength:    Strength: Within functional limits                    Tone & Sensation:   Tone: Normal              Sensation: Intact               Coordination:  Coordination: Within functional limits  Vision:      Functional Mobility:  Bed Mobility:     Supine to Sit: Moderate assistance     Scooting: Contact guard assistance  Transfers:  Sit to Stand: Contact guard assistance  Stand to Sit: Contact guard assistance                       Balance:   Sitting: Intact  Standing: Impaired  Standing - Static: Fair;Good  Standing - Dynamic : Fair;Good;Constant support  Ambulation/Gait Training:  Distance (ft): 70 Feet (ft)(x2)  Assistive Device: Gait belt;Cane, straight  Ambulation - Level of Assistance: Contact guard assistance;Stand-by assistance     Gait Description (WDL): Exceptions to WDL  Gait Abnormalities: Trunk sway increased; Path deviations        Base of Support: Widened     Speed/Sonam: Shuffled; Slow             Physical Therapy Evaluation Charge Determination   History Examination Presentation Decision-Making   MEDIUM  Complexity : 1-2 comorbidities / personal factors will impact the outcome/ POC  MEDIUM Complexity : 3 Standardized tests and measures addressing body structure, function, activity limitation and / or participation in recreation  LOW Complexity : Stable, uncomplicated  LOW Complexity : FOTO score of       Based on the above components, the patient evaluation is determined to be of the following complexity level: LOW     Pain Ratin/10 left elbow    Activity Tolerance:   Good    After treatment patient left in no apparent distress:   Sitting in chair and Call bell within reach    COMMUNICATION/EDUCATION:   The patients plan of care was discussed with: Registered nurse. Fall prevention education was provided and the patient/caregiver indicated understanding., Patient/family have participated as able in goal setting and plan of care. , and Patient/family agree to work toward stated goals and plan of care.     Thank you for this referral.  Brinda Winston, PT, DPT   Time Calculation: 37 mins

## 2021-03-22 NOTE — DISCHARGE INSTRUCTIONS
Discharge Instructions       PATIENT ID: Brittny Mead  MRN: [id number]   YOB: 1940    DATE OF ADMISSION: 3/20/2021  3:49 PM    DATE OF DISCHARGE: 3/22/2021    PRIMARY CARE PROVIDER: Kory Li MD     ATTENDING PHYSICIAN: Alirio Kim MD  DISCHARGING PROVIDER: Trent Maya MD    To contact this individual call [phone] and ask the  to page. If unavailable ask to be transferred the Adult Hospitalist Department. DISCHARGE DIAGNOSES Left elbow fracture    CONSULTATIONS: IP CONSULT TO ORTHOPEDIC SURGERY    PROCEDURES/SURGERIES: Procedure(s):  ELBOW OPEN REDUCTION INTERNAL FIXATION    PENDING TEST RESULTS:   At the time of discharge the following test results are still pending:     FOLLOW UP APPOINTMENTS:   Follow-up Information     Follow up With Specialties Details Why Contact Info    Viraj Mcghee MD Internal Medicine In 1 week  [address] [place] [address] [phone]             ADDITIONAL CARE RECOMMENDATIONS:     DIET: Cardiac Diet    ACTIVITY: Activity as tolerated    WOUND CARE:     EQUIPMENT needed:       Radiology      DISCHARGE MEDICATIONS:   See Medication Reconciliation Form    · It is important that you take the medication exactly as they are prescribed. · Keep your medication in the bottles provided by the pharmacist and keep a list of the medication names, dosages, and times to be taken in your wallet. · Do not take other medications without consulting your doctor. NOTIFY YOUR PHYSICIAN FOR ANY OF THE FOLLOWING:   Fever over 101 degrees for 24 hours. Chest pain, shortness of breath, fever, chills, nausea, vomiting, diarrhea, change in mentation, falling, weakness, bleeding. Severe pain or pain not relieved by medications. Or, any other signs or symptoms that you may have questions about.       DISPOSITION:  x  Home With:   OT  PT  HH  RN       SNF/Inpatient Rehab/LTAC    Independent/assisted living    Hospice    Other:     CDMP Checked: Yes x     PROBLEM LIST Updated:  Yes x       Signed:   Castro Sage MD  3/22/2021  1:14 PM

## 2021-03-22 NOTE — PROGRESS NOTES
Day #1 of Keflex  Indication:  SSTI  Current regimen:  Keflex 500mg po QID  Recent Labs     21  0217 21  0443 21  1618   WBC  --  12.1* 8.5   CREA 1.08* 0.84 0.93   BUN 19 23* 23*     Est CrCl: 30-60ml/min  Temp (24hrs), Av °F (36.7 °C), Min:97.6 °F (36.4 °C), Max:99 °F (37.2 °C)    Plan: Change to Keflex 500mg po BID x 5 days

## 2021-03-22 NOTE — PROGRESS NOTES
Primary Nurse Roland Kay and Nano Corona RN performed a dual skin assessment on this patient Impairment noted as follows:    -Incision R arm (post surgery)  -Scattered bruising and scrapes on face, stiches above L eyebrow    Tan score is 20

## 2021-03-22 NOTE — PROGRESS NOTES
LUANN: Patient discharging home today with [facility]. Family to transport. RUR: 12%    1. Patient is from home and lives alone. 2. Ortho, PT/OT following  3. Patient does not want SNF and wants to go home with HH/PT. Care Management Interventions  PCP Verified by CM: Yes  Palliative Care Criteria Met (RRAT>21 & CHF Dx)?: No  Mode of Transport at Discharge: Other (see comment)  Transition of Care Consult (CM Consult): [address]: No  Reason Outside [place]: Physician referred to specific agency  MyChart Signup: No  Discharge Durable Medical Equipment: No  Health Maintenance Reviewed: Yes  Physical Therapy Consult: Yes  Occupational Therapy Consult: Yes  Speech Therapy Consult: No  Current Support Network: Lives Alone  Confirm Follow Up Transport: Family  The Plan for Transition of Care is Related to the Following Treatment Goals : Patient want to discharge home with [place]. Family lives close by and can assist with needs. The Patient and/or Patient Representative was Provided with a Choice of Provider and Agrees with the Discharge Plan?: Yes  Freedom of Choice List was Provided with Basic Dialogue that Supports the Patient's Individualized Plan of Care/Goals, Treatment Preferences and Shares the Quality Data Associated with the Providers?: Yes  [place] Resource Information Provided?: No  Discharge Location  Discharge Placement: Home with outpatient services     Reason for Admission:  Left elbow open reduction internal fixation. RUR Score:   12%                  Plan for utilizing home health: The Plan for Transition of Care is related to the following treatment goals: [place]    The Patient and/or patient representative Juan Gamble was provided with a choice of provider and agrees   with the discharge plan.  [x] Yes [] No    Freedom of choice list was provided with basic dialogue that supports the patient's individualized plan of care/goals, treatment preferences and shares the quality data associated with the providers. [x] Yes [] No          PCP: First and Last name:  Ramyon Farley MD     Name of Practice:    Are you a current patient: Yes/No: Yes Approximate date of last visit: March 2021   Can you participate in a virtual visit with your PCP: No                    Current Advanced Directive/Advance Care Plan: Full Code      Healthcare Decision Maker:   Click here to complete [address] including selection of the Healthcare Decision Maker Relationship (ie \"Primary\")                             Transition of Care Plan:                      CM spoke with patient and daughter in room 561. Patient is alert and oriented x4. Demographics confirmed. Patient lives alone independently and owns an old cane. Patient's brother lives upstairs and daughter works remotely and is also close by to assist with needs. The patient sill drives a vehicle and uses [employer]. The patient refuses to go to a SNF and wants HH/PT. The daughter agrees with plan and she and the patient's brother can assist with any needs. [facility] and referrals sent to [facility] and [place] [address]. Family to transport when stable for discharge. CM sent referrals and following for discharge needs. 1418: Patient chose [place] [id number] and was accepted. Patient discharging today.     Azul Thompson RN/CRM

## 2021-03-22 NOTE — PROGRESS NOTES
[facility] Adult  Hospitalist Group                                                                                          Hospitalist Progress Note  Anamaria Guidry MD  Answering service: [phone] from in house phone        Date of Service:  3/22/2021  NAME:  Ulises Geiger  :  1940  MRN:  [id number]      Admission Summary:   Ulises Geiger is a 79 y.o. female with past medical history of GERD, asthma, A. fib and major depression who comes in after a fall    Interval history / Subjective:   S/p Open reduction internal fixation of left elbow Monteggia fracture dislocation   Had a fall no orbital fracture there are stitches on left eye brow     Assessment & Plan:     Ground-level fall with Left olecranon fracture with radial head dislocation  Facial laceration near left eye with hematoma formation, POA  S/p Open reduction internal fixation of left elbow Monteggia fracture dislocation (CPT 07486)  Pain control on fátima / tramadol  CT orbit 3/21 - No fracture. No change      PAF, currently in normal sinus rhythm  Patient was started on Eliquis 5 mg p.o. twice daily yesterday (she has not started it yet and is due to start tomorrow) via virtual visit given [id number] score of 6 but given the fall and facial laceration, cardiology was consulted in the ED and they recommend deferring anticoagulation for now for at least 2 weeks, patient may need watchman device.   Continue metoprolol     GERDcontinue PPI     History of TIA in had difficulty speaking, currently on aspirinhold aspirin for now until surgery is done and then it could be resumed.     Hypothyroidism, continue Synthroid     Hyperlipidemiamanaged with lifestyle changes    Code status: Full  DVT prophylaxis: SCD    Care Plan discussed with: Patient/Family and Nurse  Anticipated Disposition: Home w/Family and SNF/LTC  Anticipated Discharge: 24 hrs awaiting clearence from ortho     Hospital Problems  Date Reviewed: 3/19/2021          Codes Class Noted POA    Left elbow fracture, closed, initial encounter ICD-10-CM: S42.402A  ICD-9-CM: 812.40  3/20/2021 Unknown                Review of Systems:   A comprehensive review of systems was negative. Vital Signs:    Last 24hrs VS reviewed since prior progress note. Most recent are:  Visit Vitals  BP (!) 159/80 (BP 1 Location: Right upper arm, BP Patient Position: At rest)   Pulse 72   Temp 97.6 °F (36.4 °C)   Resp 16   Ht 5' 5\" (1.651 m)   Wt 91.8 kg (202 lb 6.1 oz)   SpO2 94%   BMI 33.68 kg/m²         Intake/Output Summary (Last 24 hours) at 3/22/2021 1104  Last data filed at 3/21/2021 1931  Gross per 24 hour   Intake 1250 ml   Output 225 ml   Net 1025 ml        Physical Examination:     I had a face to face encounter with this patient and independently examined them on 3/22/2021 as outlined below:          Constitutional:  No acute distress, left orbital bruising    ENT:  Oral mucosa moist, oropharynx benign. Resp:  CTA bilaterally. No wheezing/rhonchi/rales. No accessory muscle use   CV:  Regular rhythm, normal rate, no murmurs, gallops, rubs    GI:  Soft, non distended, non tender. normoactive bowel sounds, no hepatosplenomegaly     Musculoskeletal:  left hand/ arm in sling     Neurologic:  Moves all extremities. AAOx3, CN II-XII reviewed     Psych:  Good insight, Not anxious nor agitated. Data Review:    I personally reviewed  Image and labs      Labs:     Recent Labs     03/21/21  0443 03/20/21  1618   WBC 12.1* 8.5   HGB 12.9 13.0   HCT 40.2 40.5    242     Recent Labs     03/22/21  0217 03/21/21  0443 03/20/21  1618    137 139   K 4.2 4.0 4.0    108 109*   CO2 26 23 25   BUN 19 23* 23*   CREA 1.08* 0.84 0.93   GLU 96 122* 81   CA 8.5 8.4* 8.6     Recent Labs     03/20/21  1618   ALT 30      TBILI 0.3   TP 7.3   ALB 3.4*   GLOB 3.9     No results for input(s): INR, PTP, APTT, INREXT, INREXT in the last 72 hours.    No results for input(s): FE, TIBC, PSAT, FERR in the last 72 hours. No results found for: FOL, RBCF   No results for input(s): PH, PCO2, PO2 in the last 72 hours. No results for input(s): CPK, CKNDX, TROIQ in the last 72 hours.     No lab exists for component: CPKMB  Lab Results   Component Value Date/Time    Cholesterol, total 199 08/14/2020 01:53 PM    HDL Cholesterol 59 08/14/2020 01:53 PM    LDL, calculated 118 (H) 08/14/2020 01:53 PM    Triglyceride 108 08/14/2020 01:53 PM     No results found for: GLUCPOC  No results found for: COLOR, APPRN, SPGRU, REFSG, JANE, PROTU, GLUCU, KETU, BILU, UROU, TOY, LEUKU, GLUKE, EPSU, BACTU, WBCU, RBCU, CASTS, UCRY      Medications Reviewed:     Current Facility-Administered Medications   Medication Dose Route Frequency    cephALEXin (KEFLEX) capsule 500 mg  500 mg Oral BID    aspirin chewable tablet 81 mg  81 mg Oral DAILY    0.9% sodium chloride infusion  125 mL/hr IntraVENous CONTINUOUS    sodium chloride (NS) flush 5-40 mL  5-40 mL IntraVENous Q8H    sodium chloride (NS) flush 5-40 mL  5-40 mL IntraVENous PRN    naloxone (NARCAN) injection 0.4 mg  0.4 mg IntraVENous PRN    senna-docusate (PERICOLACE) 8.6-50 mg per tablet 1 Tab  1 Tab Oral BID    polyethylene glycol (MIRALAX) packet 17 g  17 g Oral DAILY    [START ON 3/23/2021] bisacodyL (DULCOLAX) suppository 10 mg  10 mg Rectal DAILY PRN    acetaminophen (TYLENOL) tablet 650 mg  650 mg Oral Q6H    traMADoL (ULTRAM) tablet 50 mg  50 mg Oral Q6H PRN    oxyCODONE IR (ROXICODONE) tablet 5 mg  5 mg Oral Q4H PRN    hydrOXYzine HCL (ATARAX) tablet 10 mg  10 mg Oral Q8H PRN    citalopram (CELEXA) tablet 30 mg  30 mg Oral QHS    acetaminophen (TYLENOL) tablet 650 mg  650 mg Oral Q6H PRN    Or    acetaminophen (TYLENOL) suppository 650 mg  650 mg Rectal Q6H PRN    polyethylene glycol (MIRALAX) packet 17 g  17 g Oral DAILY PRN    promethazine (PHENERGAN) tablet 12.5 mg  12.5 mg Oral Q6H PRN    Or    ondansetron (ZOFRAN) injection 4 mg  4 mg IntraVENous Q6H PRN    pantoprazole (PROTONIX) tablet 40 mg  40 mg Oral ACB    latanoprost (XALATAN) 0.005 % ophthalmic solution 1 Drop  1 Drop Both Eyes QPM    dorzolamide (TRUSOPT) 2 % ophthalmic solution 2 Drop  2 Drop Both Eyes TID    metoprolol succinate (TOPROL-XL) XL tablet 12.5 mg  12.5 mg Oral QHS    levothyroxine (SYNTHROID) tablet 50 mcg  50 mcg Oral 6am    ketotifen (ZADITOR) 0.025 % (0.035 %) ophthalmic solution 1 Drop  1 Drop Both Eyes BID    traMADoL (ULTRAM) tablet 50 mg  50 mg Oral Q6H PRN     ______________________________________________________________________  EXPECTED LENGTH OF STAY: - - -  ACTUAL LENGTH OF STAY:          2                 Saman Hagen MD

## 2021-03-22 NOTE — PATIENT INSTRUCTIONS
MD Tay Davis, RN  
  
   
  
Starting Eliquis CBC CMP mag in 3 months Future Appointments Date Time Provider Lyle Caitlyn 4/8/2021  2:00 PM Lisa Mcghee  [facility], [place]  
4/27/2021  1:20 PM MD IVAN Velasquez BS AMB  
6/1/2021  3:20 PM MD IVAN Davis BS AMB Lab slip mailed to patient

## 2021-03-25 PROBLEM — I48.91 ATRIAL FIBRILLATION WITH RVR (HCC): Status: ACTIVE | Noted: 2021-03-25

## 2021-07-09 ENCOUNTER — PATIENT MESSAGE (OUTPATIENT)
Dept: CARDIOLOGY CLINIC | Age: 81
End: 2021-07-09

## 2021-08-01 PROBLEM — Z86.2 HISTORY OF SECONDARY HYPERCOAGULABLE STATE: Status: ACTIVE | Noted: 2021-08-01

## 2022-02-09 ENCOUNTER — TRANSCRIBE ORDER (OUTPATIENT)
Dept: SCHEDULING | Age: 82
End: 2022-02-09

## 2022-02-09 DIAGNOSIS — Z12.31 ENCOUNTER FOR MAMMOGRAM TO ESTABLISH BASELINE MAMMOGRAM: Primary | ICD-10-CM

## 2022-02-24 ENCOUNTER — HOSPITAL ENCOUNTER (OUTPATIENT)
Dept: MAMMOGRAPHY | Age: 82
Discharge: HOME | End: 2022-02-24
Attending: INTERNAL MEDICINE
Payer: MEDICARE

## 2022-02-24 DIAGNOSIS — Z12.31 ENCOUNTER FOR MAMMOGRAM TO ESTABLISH BASELINE MAMMOGRAM: ICD-10-CM

## 2022-02-24 PROCEDURE — 77063 BREAST TOMOSYNTHESIS BI: CPT

## 2022-02-24 PROCEDURE — 77067 SCR MAMMO BI INCL CAD: CPT

## 2022-03-19 PROBLEM — Z86.2 HISTORY OF SECONDARY HYPERCOAGULABLE STATE: Status: ACTIVE | Noted: 2021-08-01

## 2022-03-19 PROBLEM — I48.91 ATRIAL FIBRILLATION WITH RVR (HCC): Status: ACTIVE | Noted: 2021-03-25

## 2022-03-19 PROBLEM — R05.9 COUGH: Status: ACTIVE | Noted: 2018-09-25

## 2022-04-20 PROBLEM — D68.69 SECONDARY HYPERCOAGULABLE STATE (HCC): Status: ACTIVE | Noted: 2022-04-20

## 2023-05-24 RX ORDER — CLOPIDOGREL BISULFATE 75 MG/1
75 TABLET ORAL DAILY
COMMUNITY

## 2023-05-24 RX ORDER — LEVOTHYROXINE SODIUM 0.05 MG/1
1 TABLET ORAL DAILY
COMMUNITY
Start: 2022-06-02

## 2023-05-24 RX ORDER — DILTIAZEM HYDROCHLORIDE 120 MG/1
120 CAPSULE, EXTENDED RELEASE ORAL DAILY
COMMUNITY
Start: 2021-03-25

## 2023-05-24 RX ORDER — PENICILLIN V POTASSIUM 250 MG/1
250 TABLET ORAL 4 TIMES DAILY
COMMUNITY
Start: 2022-04-25

## 2023-05-24 RX ORDER — OLOPATADINE HYDROCHLORIDE 2 MG/ML
1 SOLUTION/ DROPS OPHTHALMIC DAILY
COMMUNITY

## 2023-05-24 RX ORDER — METOPROLOL TARTRATE 50 MG/1
50 TABLET, FILM COATED ORAL 2 TIMES DAILY
COMMUNITY
Start: 2021-10-07

## 2023-05-24 RX ORDER — CITALOPRAM 20 MG/1
1 TABLET ORAL DAILY
COMMUNITY
Start: 2021-05-22 | End: 2023-07-28

## 2023-05-24 RX ORDER — OMEPRAZOLE 20 MG/1
1 CAPSULE, DELAYED RELEASE ORAL DAILY
COMMUNITY
Start: 2023-04-03

## 2023-05-24 RX ORDER — DORZOLAMIDE HCL 20 MG/ML
2 SOLUTION/ DROPS OPHTHALMIC 2 TIMES DAILY
COMMUNITY

## 2023-05-24 RX ORDER — ASPIRIN 81 MG/1
81 TABLET ORAL
COMMUNITY

## 2023-05-24 RX ORDER — LATANOPROST 50 UG/ML
1 SOLUTION/ DROPS OPHTHALMIC
COMMUNITY

## 2024-05-03 ENCOUNTER — HOSPITAL ENCOUNTER (OUTPATIENT)
Facility: HOSPITAL | Age: 84
End: 2024-05-03
Attending: INTERNAL MEDICINE
Payer: MEDICARE

## 2024-05-03 VITALS — WEIGHT: 195 LBS | HEIGHT: 64 IN | BODY MASS INDEX: 33.29 KG/M2

## 2024-05-03 DIAGNOSIS — E55.9 VITAMIN D DEFICIENCY: ICD-10-CM

## 2024-05-03 DIAGNOSIS — H40.1134 PRIMARY OPEN-ANGLE GLAUCOMA, BILATERAL, INDETERMINATE STAGE: ICD-10-CM

## 2024-05-03 DIAGNOSIS — M81.0 POST-MENOPAUSAL OSTEOPOROSIS: ICD-10-CM

## 2024-05-03 DIAGNOSIS — K21.9 GASTRO-ESOPHAGEAL REFLUX DISEASE WITHOUT ESOPHAGITIS: ICD-10-CM

## 2024-05-03 DIAGNOSIS — E66.9 OBESITY (BMI 30.0-34.9): ICD-10-CM

## 2024-05-03 DIAGNOSIS — F33.41 MAJOR DEPRESSIVE DISORDER, RECURRENT, IN PARTIAL REMISSION (HCC): ICD-10-CM

## 2024-05-03 DIAGNOSIS — E74.39 GLUCOSE INTOLERANCE: ICD-10-CM

## 2024-05-03 DIAGNOSIS — E03.9 ACQUIRED HYPOTHYROIDISM: ICD-10-CM

## 2024-05-03 DIAGNOSIS — Z86.79 HX OF ATRIAL FLUTTER: ICD-10-CM

## 2024-05-03 DIAGNOSIS — G47.33 OBSTRUCTIVE SLEEP APNEA: ICD-10-CM

## 2024-05-03 DIAGNOSIS — R05.9 COUGH, UNSPECIFIED TYPE: ICD-10-CM

## 2024-05-03 DIAGNOSIS — I48.0 PAROXYSMAL ATRIAL FIBRILLATION (HCC): ICD-10-CM

## 2024-05-03 DIAGNOSIS — I34.1 MITRAL VALVE PROLAPSE: ICD-10-CM

## 2024-05-03 PROCEDURE — 77080 DXA BONE DENSITY AXIAL: CPT

## 2024-05-11 ENCOUNTER — HOSPITAL ENCOUNTER (EMERGENCY)
Facility: HOSPITAL | Age: 84
Discharge: HOME | End: 2024-05-11
Attending: EMERGENCY MEDICINE
Payer: MEDICARE

## 2024-05-11 ENCOUNTER — APPOINTMENT (OUTPATIENT)
Facility: HOSPITAL | Age: 84
End: 2024-05-11
Payer: MEDICARE

## 2024-05-11 VITALS
BODY MASS INDEX: 34.02 KG/M2 | RESPIRATION RATE: 18 BRPM | DIASTOLIC BLOOD PRESSURE: 65 MMHG | OXYGEN SATURATION: 96 % | HEART RATE: 68 BPM | HEIGHT: 64 IN | TEMPERATURE: 97.8 F | SYSTOLIC BLOOD PRESSURE: 129 MMHG | WEIGHT: 199.3 LBS

## 2024-05-11 DIAGNOSIS — R05.1 ACUTE COUGH: Primary | ICD-10-CM

## 2024-05-11 PROCEDURE — 71046 X-RAY EXAM CHEST 2 VIEWS: CPT

## 2024-05-11 PROCEDURE — 99283 EMERGENCY DEPT VISIT LOW MDM: CPT

## 2024-05-11 ASSESSMENT — ENCOUNTER SYMPTOMS
COUGH: 0
SORE THROAT: 0
VOMITING: 0

## 2024-05-11 ASSESSMENT — PAIN - FUNCTIONAL ASSESSMENT: PAIN_FUNCTIONAL_ASSESSMENT: NONE - DENIES PAIN

## 2024-05-11 NOTE — PROGRESS NOTES
1:53 PM    Pt presents to the ED with the following PMHx:     Past Medical History:   Diagnosis Date    Asthma     Atrial fibrillation (HCC) 03/16/2021    Atrial flutter (HCC) 03/2021    on Loop monitor     Carotid artery disease (HCC) 10/07/2022    doppler b/l ICA intimal thickening    Colon polyps     tubular adenoma 2017    Colon polyps     Fracture 2017    Right humerus (pt fell)    GERD (gastroesophageal reflux disease)     Glaucoma     Glucose intolerance     A1C 5.7% in 2022, 5.9% in 2023    Humeral fracture     2017    Hyperlipidemia     Macular edema     Major depression in partial remission (HCC)     Mitral valve prolapse     Osteopenia     TIA (transient ischemic attack)        with concerns for \"needs chest x-ray.\" States had  PCP appointment this week and referred \"you need to get a bone marrow test.\" States this was done outpatient. Reports ongoing unproductive cough and \"need for chest x-ray.\" Pt reports that she has been coughing for 1 year.     Denies fevers, chills, fatigue, N/V/D, chest pain, congestion, rhinorrhea.     I have evaluated the patient as the Provider in Rapid Medical Evaluation (RME). I have reviewed her vital signs and the triage nurse assessment. I have talked with the patient and any available family and advised that I am the provider in triage and have ordered the appropriate study to initiate their work up based on the clinical presentation during my assessment. I have advised that the patient will be accommodated in the Main ED as soon as possible. I have also requested to contact the triage nurse or myself immediately if the patient experiences any changes in their condition during this brief waiting period.  SHADE Candelaria - NP

## 2024-05-11 NOTE — ED TRIAGE NOTES
Patient arrives to ED reports cough for \"all year\"    Patient saw PCP and recommended chest x ray

## 2024-05-11 NOTE — ED PROVIDER NOTES
[facility] EMERGENCY DEP  EMERGENCY DEPARTMENT ENCOUNTER      Pt Name: Qi Denton  MRN: [id number]  Birthdate 1940  Date of evaluation: 5/11/2024  Provider: Matt South MD    CHIEF COMPLAINT       Chief Complaint   Patient presents with    Cough         HISTORY OF PRESENT ILLNESS   (Location/Symptom, Timing/Onset, Context/Setting, Quality, Duration, Modifying Factors, Severity)  Note limiting factors.   Coughing for over a year.  Advised to come in for chest xray by PCP.  Denies cp, sob, fever.              Review of External Medical Records:     Nursing Notes were reviewed.    REVIEW OF SYSTEMS    (2-9 systems for level 4, 10 or more for level 5)     Review of Systems   Constitutional:  Negative for fatigue.   HENT:  Negative for sore throat.    Eyes:  Negative for visual disturbance.   Respiratory:  Negative for cough.    Cardiovascular:  Negative for palpitations.   Gastrointestinal:  Negative for vomiting.   Genitourinary:  Negative for difficulty urinating.   Musculoskeletal:  Negative for myalgias.   Skin:  Negative for rash.   Neurological:  Negative for weakness.       Except as noted above the remainder of the review of systems was reviewed and negative.       PAST MEDICAL HISTORY     Past Medical History:   Diagnosis Date    Asthma     Atrial fibrillation (HCC) 03/16/2021    Atrial flutter (HCC) 03/2021    on Loop monitor     Carotid artery disease (HCC) 10/07/2022    doppler b/l ICA intimal thickening    Colon polyps     tubular adenoma 2017    Colon polyps     Fracture 2017    Right humerus (pt fell)    GERD (gastroesophageal reflux disease)     Glaucoma     Glucose intolerance     A1C 5.7% in 2022, 5.9% in 2023    Humeral fracture     2017    Hyperlipidemia     Macular edema     Major depression in partial remission (HCC)     Mitral valve prolapse     Osteopenia     TIA (transient ischemic attack)          SURGICAL HISTORY       Past Surgical History:   Procedure Laterality Date    BREAST  No

## (undated) DEVICE — REM POLYHESIVE ADULT PATIENT RETURN ELECTRODE: Brand: VALLEYLAB

## (undated) DEVICE — INTENDED FOR TISSUE SEPARATION, AND OTHER PROCEDURES THAT REQUIRE A SHARP SURGICAL BLADE TO PUNCTURE OR CUT.: Brand: BARD-PARKER ® CARBON RIB-BACK BLADES

## (undated) DEVICE — SUTURE MCRYL SZ 4-0 L27IN ABSRB UD L19MM PS-2 1/2 CIR PRIM Y426H

## (undated) DEVICE — PADDING CAST 4 INX5 YD STRL

## (undated) DEVICE — K WIRE FIX L150MM DIA1.6MM S STL TRCR PNT

## (undated) DEVICE — 2.5MM DRILL BIT/QC/GOLD/110MM

## (undated) DEVICE — BANDAGE COBAN 4 IN COMPR W4INXL5YD FOAM COHESIVE QUIK STK SELF ADH SFT

## (undated) DEVICE — TOWEL SURG W17XL27IN STD BLU COT NONFENESTRATED PREWASHED

## (undated) DEVICE — BANDAGE,ELASTIC,ESMARK,STERILE,4"X9',LF: Brand: MEDLINE

## (undated) DEVICE — ROCKER SWITCH PENCIL BLADE ELECTRODE, HOLSTER: Brand: EDGE

## (undated) DEVICE — STERILE POLYISOPRENE POWDER-FREE SURGICAL GLOVES WITH EMOLLIENT COATING: Brand: PROTEXIS

## (undated) DEVICE — INFECTION CONTROL KIT SYS

## (undated) DEVICE — Device: Brand: MICROAIRE®

## (undated) DEVICE — PADDING CST 4INX4YD --

## (undated) DEVICE — DRAPE,EXTREMITY,89X128,STERILE: Brand: MEDLINE

## (undated) DEVICE — Device

## (undated) DEVICE — DISPOSABLE TOURNIQUET CUFF SINGLE BLADDER, DUAL PORT AND QUICK CONNECT CONNECTOR: Brand: COLOR CUFF

## (undated) DEVICE — SUTURE VCRL SZ 2-0 L27IN ABSRB UD L26MM SH 1/2 CIR J417H

## (undated) DEVICE — DRAPE XR C ARM 41X74IN LF --

## (undated) DEVICE — DRAPE,REIN 53X77,STERILE: Brand: MEDLINE

## (undated) DEVICE — STERILE POLYISOPRENE POWDER-FREE SURGICAL GLOVES: Brand: PROTEXIS

## (undated) DEVICE — SOLUTION SURG PREP 26 CC PURPREP

## (undated) DEVICE — STRAP,POSITIONING,KNEE/BODY,FOAM,4X60": Brand: MEDLINE

## (undated) DEVICE — STOCKINETTE,IMPERVIOUS,12X48,STERILE: Brand: MEDLINE

## (undated) DEVICE — SUTURE VCRL SZ 0 L27IN ABSRB UD L26MM CT-2 1/2 CIR J270H

## (undated) DEVICE — PACK,BASIC,SIRUS,V: Brand: MEDLINE

## (undated) DEVICE — 2.0MM DRILL BIT WITH DEPTH MARK/QC/140MM

## (undated) DEVICE — FCPS BX HOT RJ4 2.2MMX240CM -- RADIAL JAW 4 BX/40

## (undated) DEVICE — SURGICAL PROCEDURE PACK BASIN MAJ SET CUST NO CAUT